# Patient Record
Sex: FEMALE | Race: WHITE | NOT HISPANIC OR LATINO | Employment: PART TIME | URBAN - METROPOLITAN AREA
[De-identification: names, ages, dates, MRNs, and addresses within clinical notes are randomized per-mention and may not be internally consistent; named-entity substitution may affect disease eponyms.]

---

## 2017-05-18 ENCOUNTER — HOSPITAL ENCOUNTER (OUTPATIENT)
Dept: RADIOLOGY | Facility: MEDICAL CENTER | Age: 22
End: 2017-05-18

## 2017-06-26 ENCOUNTER — TELEPHONE (OUTPATIENT)
Dept: PEDIATRIC ENDOCRINOLOGY | Facility: MEDICAL CENTER | Age: 22
End: 2017-06-26

## 2017-06-26 NOTE — TELEPHONE ENCOUNTER
Patient has an appt in 2 wks but she is out of her     Hydrocort 5 mg BID can she get a refill until then.    If so pharmacy is on file.

## 2017-06-27 DIAGNOSIS — E23.0 HYPOPITUITARISM (HCC): ICD-10-CM

## 2017-06-27 RX ORDER — HYDROCORTISONE 5 MG/1
5 TABLET ORAL 2 TIMES DAILY
Qty: 62 TAB | Refills: 0 | Status: SHIPPED | OUTPATIENT
Start: 2017-06-27 | End: 2017-07-13 | Stop reason: SDUPTHER

## 2017-06-30 VITALS
BODY MASS INDEX: 24.81 KG/M2 | SYSTOLIC BLOOD PRESSURE: 108 MMHG | WEIGHT: 131.39 LBS | DIASTOLIC BLOOD PRESSURE: 68 MMHG | HEART RATE: 92 BPM | HEIGHT: 61 IN

## 2017-06-30 DIAGNOSIS — R94.7 ABNORMAL RESULTS OF OTHER ENDOCRINE FUNCTION STUDIES: ICD-10-CM

## 2017-06-30 DIAGNOSIS — R62.0 DELAYED MILESTONE IN CHILDHOOD: ICD-10-CM

## 2017-06-30 DIAGNOSIS — E23.0 HYPOPITUITARISM (HCC): ICD-10-CM

## 2017-06-30 DIAGNOSIS — C71.8 MALIGNANT NEOPLASM OF OVERLAPPING SITES OF BRAIN (HCC): ICD-10-CM

## 2017-06-30 DIAGNOSIS — R53.82 CHRONIC FATIGUE: ICD-10-CM

## 2017-06-30 DIAGNOSIS — R62.50 UNSPECIFIED LACK OF EXPECTED NORMAL PHYSIOLOGICAL DEVELOPMENT IN CHILDHOOD: ICD-10-CM

## 2017-06-30 RX ORDER — FOLIC ACID 1 MG/1
2 TABLET ORAL DAILY
COMMUNITY
End: 2019-10-08 | Stop reason: SDUPTHER

## 2017-06-30 RX ORDER — LORAZEPAM 2 MG/1
2 TABLET ORAL PRN
Status: ON HOLD | COMMUNITY
End: 2019-08-23 | Stop reason: SDUPTHER

## 2017-06-30 RX ORDER — LEVOTHYROXINE SODIUM 0.05 MG/1
50 TABLET ORAL
COMMUNITY
End: 2017-09-11 | Stop reason: SDUPTHER

## 2017-06-30 RX ORDER — PHENYTOIN SODIUM 100 MG/1
0-200 CAPSULE, EXTENDED RELEASE ORAL
Status: ON HOLD | COMMUNITY
End: 2019-08-23

## 2017-07-10 NOTE — PROGRESS NOTES
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 She underwent a partial resection  of the tumor followed by chemotherapy, craniospinal radiation (unknown  dose) and ultimately autologous stem cell transplant. She has seizures for  which she is on tapering doses of Depakote and Phenobarbitol and  replacing with new meds as she continues to have significant seizure  activity.  She has primary ovarian failure with significantly elevated LH and  FSH levels. She was started on Lo-Ogestrel tabs; however, she was having  significant breakthrough bleeding on these all throughout the cycle.  Therefore, she was swtiched to Premarin/Provera cycling. ON her present  dose, she has never had a period or any spotting. She denies any cramping  as well.  She has had two low free T4 levels and was started on Synthroid  replacement several years ago. At that same time she was also started on  hydrocortisone replacement with the assumption that she had ACTH  deficiency.. In the past, her IGF-1 level was normal for age however. Given  the fact that she had craniospinal irradiation it is not surprising that she is  having pituitary hormone deficiencies now.  I have not seen her in over a year now. She seems to be doing quite  well on her medications. She is very diligent about taking her medications  with her mom's oversight.  She did have removal of her hippocampus in February 2015 by Dr. pineda. This was because she had worsening of her seizures. Since that time  she has not had any further seizures whatsoever. However, she clearly has  even more memory issues than she has in the past. They're hoping that in  another month or 2 that they can start weaning her off of her seizure meds  assuming that she remains seizure-free.  She continues to have normal cycles while on Premarin and Provera  although typically her menses starts on around the 21st of 22nd of  every  month instead of when she is done with Provera. It then stopped after 3-4  days. She is not having any trouble tolerating these medications.  Additionally she is on Synthroid as well as hydrocortisone. Mom did give  her a triple dose not too long ago and mom verified that she did get SoluCortef  stressed dose before she had her surgery in February 2015.  Overall, she does not feel that her energy level is any different than it  was a year or so ago. Her most recent labs are noted below and show that  the free T4 is still in the normal range although at the lower end of normal  and lower than it was in the past. However she would like to stay on her  present dose of Synthroid.  Otherwise, she is attending school just vocational pieces of that and no  academic pieces. 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  See review of systems for further information.    1. 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 Unspecified lack of expected normal physiological development in  childhood - R62.50  3. Delayed milestone in childhood - R62.0  4. Malignant neoplasm of overlapping sites of brain - C71.8  5. Other fatigue - R53.83  6. Abnormal results of other endocrine function studies - R94.7  Clinically she seems to be doing quite well and her interaction with me  today was much improved over the past. She is much quicker to speak and  more conversational. This may be due to the fact that she's not having as  many seizures anymore after her surgery. At this point we'll leave her on  all of her medications and I will see her back in about 6 months time.    Past Medical History  Primary ovarian failure  Hypopituitarism  supratentorial PNET - dx 6/2009  H/o partial tumor resection x 2, chemo, stem  cell transplant, craniospinal radiation  seizure disorder - daily ONFI + Dilantin  Febrile seizures until ~ age 6 yr  R foot drop  Very poor short term memory    Surgical History  tumor resection as noted  above.  removal of L hippocampus for seizure control -  Dr Tio Valero 2/2015    Family History  Pertinent Positive: unknown  unknown - adopted from [place] @ 13 mos.    Social History  Smoking Smoking Status Never Smoker.  graduated [employer]  6/2014;- back at school  [employer] for vocational training. Also volunteers at  [facility] lives with mom and 2 younger adopted  sisters (not biologic).    Allergies  N.K.D.A.    Hospitalization/Major Diagnostic Procedure  Stem cell transplant  Neurosurgery x 2, chemotherapy, XRT

## 2017-07-13 ENCOUNTER — OFFICE VISIT (OUTPATIENT)
Dept: PEDIATRIC ENDOCRINOLOGY | Facility: MEDICAL CENTER | Age: 22
End: 2017-07-13
Payer: MEDICAID

## 2017-07-13 ENCOUNTER — HOSPITAL ENCOUNTER (OUTPATIENT)
Dept: LAB | Facility: MEDICAL CENTER | Age: 22
End: 2017-07-13
Attending: PEDIATRICS
Payer: MEDICAID

## 2017-07-13 VITALS
WEIGHT: 138.23 LBS | HEIGHT: 61 IN | BODY MASS INDEX: 26.1 KG/M2 | SYSTOLIC BLOOD PRESSURE: 92 MMHG | HEART RATE: 76 BPM | DIASTOLIC BLOOD PRESSURE: 60 MMHG

## 2017-07-13 DIAGNOSIS — R62.0 DELAYED MILESTONE IN CHILDHOOD: ICD-10-CM

## 2017-07-13 DIAGNOSIS — R62.50 UNSPECIFIED LACK OF EXPECTED NORMAL PHYSIOLOGICAL DEVELOPMENT IN CHILDHOOD: ICD-10-CM

## 2017-07-13 DIAGNOSIS — C71.8 MALIGNANT NEOPLASM OF OVERLAPPING SITES OF BRAIN (HCC): ICD-10-CM

## 2017-07-13 DIAGNOSIS — E23.0 HYPOPITUITARISM (HCC): ICD-10-CM

## 2017-07-13 DIAGNOSIS — R94.7 ABNORMAL RESULTS OF OTHER ENDOCRINE FUNCTION STUDIES: ICD-10-CM

## 2017-07-13 LAB
ALBUMIN SERPL BCP-MCNC: 4.2 G/DL (ref 3.2–4.9)
ALBUMIN/GLOB SERPL: 1.3 G/DL
ALP SERPL-CCNC: 130 U/L (ref 30–99)
ALT SERPL-CCNC: 17 U/L (ref 2–50)
ANION GAP SERPL CALC-SCNC: 5 MMOL/L (ref 0–11.9)
AST SERPL-CCNC: 18 U/L (ref 12–45)
BASOPHILS # BLD AUTO: 0.1 % (ref 0–1.8)
BASOPHILS # BLD: 0.01 K/UL (ref 0–0.12)
BILIRUB SERPL-MCNC: 0.3 MG/DL (ref 0.1–1.5)
BUN SERPL-MCNC: 11 MG/DL (ref 8–22)
CALCIUM SERPL-MCNC: 9.3 MG/DL (ref 8.5–10.5)
CHLORIDE SERPL-SCNC: 103 MMOL/L (ref 96–112)
CO2 SERPL-SCNC: 30 MMOL/L (ref 20–33)
CREAT SERPL-MCNC: 0.68 MG/DL (ref 0.5–1.4)
EOSINOPHIL # BLD AUTO: 0.08 K/UL (ref 0–0.51)
EOSINOPHIL NFR BLD: 1.1 % (ref 0–6.9)
ERYTHROCYTE [DISTWIDTH] IN BLOOD BY AUTOMATED COUNT: 45.7 FL (ref 35.9–50)
EST. AVERAGE GLUCOSE BLD GHB EST-MCNC: 108 MG/DL
GFR SERPL CREATININE-BSD FRML MDRD: >60 ML/MIN/1.73 M 2
GLOBULIN SER CALC-MCNC: 3.2 G/DL (ref 1.9–3.5)
GLUCOSE SERPL-MCNC: 75 MG/DL (ref 65–99)
HBA1C MFR BLD: 5.4 % (ref 0–5.6)
HCT VFR BLD AUTO: 40.4 % (ref 37–47)
HGB BLD-MCNC: 13.1 G/DL (ref 12–16)
IMM GRANULOCYTES # BLD AUTO: 0.02 K/UL (ref 0–0.11)
IMM GRANULOCYTES NFR BLD AUTO: 0.3 % (ref 0–0.9)
LYMPHOCYTES # BLD AUTO: 2.4 K/UL (ref 1–4.8)
LYMPHOCYTES NFR BLD: 34.1 % (ref 22–41)
MCH RBC QN AUTO: 30 PG (ref 27–33)
MCHC RBC AUTO-ENTMCNC: 32.4 G/DL (ref 33.6–35)
MCV RBC AUTO: 92.7 FL (ref 81.4–97.8)
MONOCYTES # BLD AUTO: 0.55 K/UL (ref 0–0.85)
MONOCYTES NFR BLD AUTO: 7.8 % (ref 0–13.4)
NEUTROPHILS # BLD AUTO: 3.98 K/UL (ref 2–7.15)
NEUTROPHILS NFR BLD: 56.6 % (ref 44–72)
NRBC # BLD AUTO: 0 K/UL
NRBC BLD AUTO-RTO: 0 /100 WBC
PLATELET # BLD AUTO: 166 K/UL (ref 164–446)
PMV BLD AUTO: 8.3 FL (ref 9–12.9)
POTASSIUM SERPL-SCNC: 4.7 MMOL/L (ref 3.6–5.5)
PROT SERPL-MCNC: 7.4 G/DL (ref 6–8.2)
RBC # BLD AUTO: 4.36 M/UL (ref 4.2–5.4)
SODIUM SERPL-SCNC: 138 MMOL/L (ref 135–145)
T4 SERPL-MCNC: 7.8 UG/DL (ref 4–12)
WBC # BLD AUTO: 7 K/UL (ref 4.8–10.8)

## 2017-07-13 PROCEDURE — 80053 COMPREHEN METABOLIC PANEL: CPT

## 2017-07-13 PROCEDURE — 99215 OFFICE O/P EST HI 40 MIN: CPT | Performed by: PEDIATRICS

## 2017-07-13 PROCEDURE — 85025 COMPLETE CBC W/AUTO DIFF WBC: CPT

## 2017-07-13 PROCEDURE — 84305 ASSAY OF SOMATOMEDIN: CPT

## 2017-07-13 PROCEDURE — 84436 ASSAY OF TOTAL THYROXINE: CPT

## 2017-07-13 PROCEDURE — 82397 CHEMILUMINESCENT ASSAY: CPT

## 2017-07-13 PROCEDURE — 83036 HEMOGLOBIN GLYCOSYLATED A1C: CPT

## 2017-07-13 PROCEDURE — 36415 COLL VENOUS BLD VENIPUNCTURE: CPT

## 2017-07-13 RX ORDER — HYDROCORTISONE 5 MG/1
5 TABLET ORAL 2 TIMES DAILY
Qty: 62 TAB | Refills: 0 | Status: SHIPPED | OUTPATIENT
Start: 2017-07-13 | End: 2017-08-13

## 2017-07-13 ASSESSMENT — PATIENT HEALTH QUESTIONNAIRE - PHQ9: CLINICAL INTERPRETATION OF PHQ2 SCORE: 1

## 2017-07-13 NOTE — MR AVS SNAPSHOT
"        Miles Lopez Hodorowilani   2017 9:30 AM   Office Visit   MRN: [id number]    Department:  Peds Endocrinology   Dept Phone:  [phone]    Description:  Female : 1995   Provider:  Ai Moore M.D.           Reason for Visit     Follow-Up           Allergies as of 2017     No Known Allergies      You were diagnosed with     Hypopituitarism (CMS-HCC)   [080154]       Unspecified lack of expected normal physiological development in childhood   [4720540]       Delayed milestone in childhood   [099487]       Malignant neoplasm of overlapping sites of brain (CMS-HCC)   [974491]       Abnormal results of other endocrine function studies   [4228787]         Vital Signs     Blood Pressure Pulse Height Weight Body Mass Index Smoking Status    92/60 mmHg 76 1.551 m (5' 1.06\") 62.7 kg (138 lb 3.7 oz) 26.06 kg/m2 Never Smoker       Basic Information     Date Of Birth Sex Race Ethnicity Preferred Language    1995 Female White Non- English      Your appointments     2018  1:00 PM   Follow Up Visit with Ai Moore M.D.   [facility] (--)    [address] [place] [address] [phone]           You will be receiving a confirmation call a few days before your appointment from our automated call confirmation system.              Problem List              ICD-10-CM Priority Class Noted - Resolved    Hypopituitarism (CMS-HCC) E23.0   2017 - Present    Unspecified lack of expected normal physiological development in childhood R62.50   2017 - Present    Delayed milestone in childhood R62.0   2017 - Present    Malignant neoplasm of overlapping sites of brain (CMS-HCC) C71.8   2017 - Present    Chronic fatigue R53.82   2017 - Present    Abnormal results of other endocrine function studies R94.7   2017 - Present      Health Maintenance        Date Due Completion Dates    IMM HEP B VACCINE ( 3 - " Primary Series) 1995 ---    IMM HEP A VACCINE (1 of 2 - Standard Series) 7/18/1997 ---    IMM HPV VACCINE (1 of 3 - Female 3 Dose Series) 7/18/2007 ---    IMM VARICELLA (CHICKENPOX) VACCINE (1 of 2 - 2 Dose Adolescent Series) 7/18/2009 ---    IMM MENINGOCOCCAL VACCINE (MCV4) (1 of 1) 7/18/2012 ---    IMM DTaP/Tdap/Td Vaccine (1 - Tdap) 7/18/2015 ---    IMM INFLUENZA (1) 9/1/2017 ---            Current Immunizations     No immunizations on file.      Below and/or attached are the medications your provider expects you to take. Review all of your home medications and newly ordered medications with your provider and/or pharmacist. Follow medication instructions as directed by your provider and/or pharmacist. Please keep your medication list with you and share with your provider. Update the information when medications are discontinued, doses are changed, or new medications (including over-the-counter products) are added; and carry medication information at all times in the event of emergency situations     Allergies:  No Known Allergies          Medications  Valid as of: July 13, 2017 - 10:21 AM    Generic Name Brand Name Tablet Size Instructions for use    Acetaminophen (Tab) TYLENOL 325 MG Take 650 mg by mouth every 6 hours as needed for Mild Pain. DO NOT give if febrile without consulting MD         Calcium Carbonate-Vitamin D (Tab) Calcium Carbonate-Vitamin D 600-400 MG-UNIT Take  by mouth.        CloBAZam (Tab) ONFI 5 MG Take 10 mg by mouth 2 Times a Day.        Estrogens Conjugated (Tab) PREMARIN 0.9 MG Take 0.9 mg by mouth every day.        Folic Acid (Tab) FOLVITE 1 MG Take 1 mg by mouth 2 Times a Day.        Hydrocortisone (Tab) CORTEF 5 MG Take 1 Tab by mouth 2 Times a Day for 31 days.        Hydrocortisone Sod Succinate (Recon Soln) SOLU-CORTEF 100  mg by Intravenous route every 8 hours.        Levothyroxine Sodium (Tab) SYNTHROID 50 MCG Take 50 mcg by mouth Every morning on an empty stomach.           LORazepam (Tab) ATIVAN 2 MG Take 2 mg by mouth as needed (seizures).        MedroxyPROGESTERone Acetate (Tab) PROVERA 10 MG Take 5 mg by mouth every day.        Multiple Vitamins-Minerals   Take 1 Tab by mouth every day. Multivitamin without iron         Phenytoin Sodium Extended (Cap) DILANTIN 100 MG Take 300 mg by mouth every bedtime.        Pyridoxine HCl (Tab) VITAMIN B-6 50 MG Take 50 mg by mouth every day.        Soft Lens Products (Solution) Saline 0.9 % 80 mL by Does not apply route every day. Per home regimen        Voriconazole (Tab) VFEND 200 MG Take 200 mg by mouth 2 times a day.        .                 Medicines prescribed today were sent to:     [id number] [address] [address]    Phone: [phone] Fax: [phone]    Open 24 Hours?: No      Medication refill instructions:       If your prescription bottle indicates you have medication refills left, it is not necessary to call your provider’s office. Please contact your pharmacy and they will refill your medication.    If your prescription bottle indicates you do not have any refills left, you may request refills at any time through one of the following ways: The online [employer] system (except Urgent Care), by calling your provider’s office, or by asking your pharmacy to contact your provider’s office with a refill request. Medication refills are processed only during regular business hours and may not be available until the next business day. Your provider may request additional information or to have a follow-up visit with you prior to refilling your medication.   *Please Note: Medication refills are assigned a new Rx number when refilled electronically. Your pharmacy may indicate that no refills were authorized even though a new prescription for the same medication is available at the pharmacy. Please request the medicine by name with the pharmacy before contacting  your provider for a refill.        Your To Do List     Future Labs/Procedures Complete By Expires    CBC w Differential  As directed 7/13/2018    Comprehensive Metabolic Panel  As directed 7/13/2018    Hemoglobin A1C  As directed 7/13/2018    IGF 1 Somatomedin  As directed 7/13/2018    IGF BP-3  As directed 7/13/2018    T4 Total  As directed 7/13/2018         Life Metrics Access Code: [email or web address]  Expires: 8/12/2017 10:21 AM    Life Metrics  A secure, online tool to manage your health information     [employer] Life Metrics® is a secure, online tool that connects you to your personalized health information from the privacy of your home -- day or night - making it very easy for you to manage your healthcare. Once the activation process is completed, you can even access your medical information using the Life Metrics octavia, which is available for free in the Apple Octavia store or Google Play store.     Life Metrics provides the following levels of access (as shown below):   My Chart Features   [facility] Doctor [facility]  Specialists [facility]  Urgent  Care Non-[facility]  Primary Care  Doctor   Email your healthcare team securely and privately 24/7 X X X    Manage appointments: schedule your next appointment; view details of past/upcoming appointments X      Request prescription refills. X      View recent personal medical records, including lab and immunizations X X X X   View health record, including health history, allergies, medications X X X X   Read reports about your outpatient visits, procedures, consult and ER notes X X X X   See your discharge summary, which is a recap of your hospital and/or ER visit that includes your diagnosis, lab results, and care plan. X X       How to register for Life Metrics:  1. Go to  [email or web address].  2. Click on the Sign Up Now box, which takes you to the New Member Sign Up page. You will need to provide the following information:  a. Enter your Life Metrics Access Code exactly as it appears at  the top of this page. (You will not need to use this code after you’ve completed the sign-up process. If you do not sign up before the expiration date, you must request a new code.)   b. Enter your date of birth.   c. Enter your home email address.   d. Click Submit, and follow the next screen’s instructions.  3. Create a [employer] ID. This will be your [employer] login ID and cannot be changed, so think of one that is secure and easy to remember.  4. Create a [employer] password. You can change your password at any time.  5. Enter your Password Reset Question and Answer. This can be used at a later time if you forget your password.   6. Enter your e-mail address. This allows you to receive e-mail notifications when new information is available in [employer].  7. Click Sign Up. You can now view your health information.    For assistance activating your [employer] account, call [phone]

## 2017-07-13 NOTE — PROGRESS NOTES
Subjective:     Chief Complaint   Patient presents with   • Follow-Up       HPI  Miles Georgina Paredes is a 21 y.o. 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 She underwent a partial resection  of the tumor followed by chemotherapy, craniospinal radiation (unknown  dose) and ultimately autologous stem cell transplant.    She has primary ovarian failure with significantly elevated LH and  FSH levels. She was started on Lo-Ogestrel tabs; however, she was having  significant breakthrough bleeding on these all throughout the cycle.  Therefore, she was swtiched to Premarin/Provera cycling.     She has had two low free T4 levels and was started on Synthroid  replacement several years ago. At that same time she was also started on  hydrocortisone replacement with the assumption that she had ACTH  deficiency.. In the past, her IGF-1 level was normal for age however. Given  the fact that she had craniospinal irradiation it is not surprising that she is  having pituitary hormone deficiencies now.    She did have removal of her hippocampus in February 2015 by Dr. Valero. This was because she had worsening of her seizures.    I have not seen her in over a year and, over the last couple of months she has had an increase in her seizures once again. She recently saw the neurologist and [facility] and they're waiting for some feedback on her labs and any dosage adjustments. Otherwise, her general health has been pretty good. She is having normal cycling with vaginal bleeding although it sounds like not always at the end of the month when she should be having it on the progesterone, estrogen cycling. Otherwise, she's been doing very well in terms of taking all of her medications. She recently had doubling of her medical present for some stress but otherwise no other issues. I noticed that she has a significant increase in her communication skills as well as her memory since her last  visit here. This is interesting in light of the surgical removal of the hippocampus. 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   HENT: Negative for congestion, ear discharge, ear pain, hearing loss, nosebleeds, sore throat and tinnitus.   Eyes: Negative for blurred vision, double vision, photophobia, pain, discharge and redness.   Respiratory: Negative for cough, hemoptysis, sputum production, shortness of breath, wheezing and stridor.    Cardiovascular: Negative for chest pain, palpitations, orthopnea, claudication, leg swelling and PND.   Gastrointestinal: Negative for heartburn, nausea, vomiting, abdominal pain, diarrhea, constipation, blood in stool and melena.   Genitourinary: Negative for dysuria, urgency, frequency, hematuria and flank pain.  Musculoskeletal: Negative for myalgias, back pain, joint pain, falls and neck pain.   Skin: Negative for itching and rash.   Neurological: Negative for dizziness, tingling, tremors, sensory change, speech change, focal weakness, seizures, loss of consciousness, weakness and headaches.   Endo/Heme/Allergies: Negative for environmental allergies and polydipsia. Does not bruise/bleed easily.   Psychiatric/Behavioral: Negative for depression, suicidal ideas, hallucinations, memory loss and substance abuse. The patient is not nervous/anxious and does not have insomnia.     No Known Allergies    Current Outpatient Prescriptions   Medication Sig Dispense Refill   • conjugated estrogen (PREMARIN) 0.9 MG Tab Take 0.9 mg by mouth every day.     • phenytoin ER (DILANTIN) 100 MG Cap Take 300 mg by mouth every bedtime.     • Calcium Carbonate-Vitamin D (CALCIUM 600 + D) 600-400 MG-UNIT Tab Take  by mouth.     • folic acid (FOLVITE) 1 MG Tab Take 1 mg by mouth 2 Times a Day.     • clobazam  "(ONFI) 5 MG Tab tablet Take 10 mg by mouth 2 Times a Day.     • levothyroxine (SYNTHROID) 50 MCG Tab Take 50 mcg by mouth Every morning on an empty stomach.     • hydrocortisone (CORTEF) 5 MG Tab Take 1 Tab by mouth 2 Times a Day for 31 days. 62 Tab 0   • medroxyPROGESTERone (PROVERA) 10 MG TABS Take 5 mg by mouth every day.     • hydrocortisone sodium succinate PF (SOLU-CORTEF) 100 MG Recon Soln injection 100 mg by Intravenous route every 8 hours.     • lorazepam (ATIVAN) 2 MG tablet Take 2 mg by mouth as needed (seizures).     • Multiple Vitamin (MULTIVITAMIN PO) Take 1 Tab by mouth every day. Multivitamin without iron      • voriconazole (VFEND) 200 MG TABS Take 200 mg by mouth 2 times a day.     • pyridoxine 50 MG TABS Take 50 mg by mouth every day.     • acetaminophen (TYLENOL) 325 MG TABS Take 650 mg by mouth every 6 hours as needed for Mild Pain. DO NOT give if febrile without consulting MD      • Soft Lens Products (SALINE) 0.9 % SOLN 80 mL by Does not apply route every day. Per home regimen       No current facility-administered medications for this visit.       Social History     Social History   • Marital Status: Single     Spouse Name: N/A   • Number of Children: N/A   • Years of Education: N/A     Occupational History   • Not on file.     Social History Main Topics   • Smoking status: Never Smoker    • Smokeless tobacco: Not on file   • Alcohol Use: No   • Drug Use: No   • Sexual Activity: Not on file     Other Topics Concern   • Not on file     Social History Narrative    Adopted from [place] @ 13 months    Graduated [employer]  6/2014, back at school [employer] for vocational training    Also volunteers at [facility] lives with mom and 2 younger adopted sisters (not bio)          Objective:   Blood pressure 92/60, pulse 76, height 1.551 m (5' 1.06\"), weight 62.7 kg (138 lb 3.7 oz).    Physical Exam   Constitutional: she is oriented to person, place, and time. she appears well-developed and well-nourished. Sparse hair "   Skin: Skin is warm and dry.   Head: Normocephalic and atraumatic.    Eyes: Pupils are equal, round, and reactive to light. No scleral icterus.  Mouth/Throat: Tongue normal. Oropharynx is clear and moist. Posterior pharynx without erythema or exudates.  Neck: Supple, trachea midline. No thyromegaly present.   Cardiovascular: Normal rate and regular rhythm.  Chest: Effort normal. Clear to auscultation throughout. No adventitious sounds.  Abdominal: Soft, non tender, and without distention. Active bowel sounds in all four quadrants. No rebound, guarding, masses or hepatosplenomegaly.  Extremities: No cyanosis, clubbing, erythema, nor edema.   Neurological: she is alert and oriented to person, place, and time. she has normal reflexes.   : Micha 5  Psychiatric: she has a normal mood and affect. her behavior is normal       Assessment and Plan:   The following treatment plan was discussed:   1. Hypopituitarism (CMS-HCC)    - Comprehensive Metabolic Panel; Future  - CBC w Differential; Future  - Hemoglobin A1C; Future  - IGF 1 Somatomedin; Future  - IGF BP-3; Future  - T4 Total; Future    2. Unspecified lack of expected normal physiological development in childhood    - Comprehensive Metabolic Panel; Future  - CBC w Differential; Future  - Hemoglobin A1C; Future  - IGF 1 Somatomedin; Future  - IGF BP-3; Future  - T4 Total; Future    3. Delayed milestone in childhood    - Comprehensive Metabolic Panel; Future  - CBC w Differential; Future  - Hemoglobin A1C; Future  - IGF 1 Somatomedin; Future  - IGF BP-3; Future  - T4 Total; Future    4. Malignant neoplasm of overlapping sites of brain (CMS-HCC)    - Comprehensive Metabolic Panel; Future  - CBC w Differential; Future  - Hemoglobin A1C; Future  - IGF 1 Somatomedin; Future  - IGF BP-3; Future  - T4 Total; Future    5. Abnormal results of other endocrine function studies  - Comprehensive Metabolic Panel; Future  - CBC w Differential; Future  - Hemoglobin A1C; Future  -  IGF 1 Somatomedin; Future  - IGF BP-3; Future  - T4 Total; Future          At this point, she seems to be doing well from an endocrine standpoint although we have not had labs done in quite some time. Therefore, we will follow up with her labs and in addition renew all of her prescriptions at this point. I will see her back in 6 months time. Greater than 50% of the time spent today was in counseling with regards to nutrition, exercise, general health issues, and medications    Follow-Up: Return in about 6 months (around 1/13/2018).

## 2017-07-13 NOTE — Clinical Note
July 13, 2017        Miles Erickson Jessica Paredes    To Whom It May concern:    For medical reasons, Miles  Is required to carry injectable medication and syringes with her at all times.      Thank you for your consideration,          Ai Moore MD    Pediatric Endocrinology

## 2017-07-14 LAB
IGF BP3 SERPL-MCNC: 4950 NG/ML (ref 3032–5992)
IGF-I SERPL-MCNC: 167 NG/ML (ref 87–368)

## 2017-07-24 ENCOUNTER — TELEPHONE (OUTPATIENT)
Dept: PEDIATRIC ENDOCRINOLOGY | Facility: MEDICAL CENTER | Age: 22
End: 2017-07-24

## 2017-07-24 NOTE — TELEPHONE ENCOUNTER
----- Message from Ai Moore M.D. sent at 7/21/2017  1:25 PM PDT -----  Labs are normal- continue current meds

## 2017-07-24 NOTE — TELEPHONE ENCOUNTER
Phone Number Called: [phone] (home)       Message: LM for mom to cb regarding results.     Left Message for patient to call back: yes

## 2017-09-08 DIAGNOSIS — E23.0 HYPOPITUITARISM (HCC): ICD-10-CM

## 2017-09-11 DIAGNOSIS — E23.0 HYPOPITUITARISM (HCC): ICD-10-CM

## 2017-09-11 RX ORDER — LEVOTHYROXINE SODIUM 0.05 MG/1
50 TABLET ORAL
Qty: 30 TAB | Refills: 5 | Status: SHIPPED | OUTPATIENT
Start: 2017-09-11 | End: 2018-04-22 | Stop reason: SDUPTHER

## 2017-09-11 NOTE — TELEPHONE ENCOUNTER
Was the patient seen in the last year in this department? Yes     Does patient have an active prescription for medications requested? Yes     Received Request Via: Pharmacy     Also needed is:    HYDROCORT 5 MG TAB PARP  For> cortef 5 mg  ta  Qty. 62  Take one by mouth twice daily

## 2017-09-12 DIAGNOSIS — E23.0 PANHYPOPITUITARISM (HCC): ICD-10-CM

## 2017-09-12 RX ORDER — HYDROCORTISONE 5 MG/1
5 TABLET ORAL 2 TIMES DAILY
Qty: 75 TAB | Refills: 5 | Status: SHIPPED | OUTPATIENT
Start: 2017-09-12 | End: 2018-04-11 | Stop reason: SDUPTHER

## 2017-10-19 ENCOUNTER — HOSPITAL ENCOUNTER (OUTPATIENT)
Dept: RADIOLOGY | Facility: MEDICAL CENTER | Age: 22
End: 2017-10-19
Attending: NEUROLOGICAL SURGERY
Payer: MEDICAID

## 2017-10-19 DIAGNOSIS — D49.6 BRAIN NEOPLASM (HCC): ICD-10-CM

## 2017-10-19 PROCEDURE — 70553 MRI BRAIN STEM W/O & W/DYE: CPT

## 2017-10-19 PROCEDURE — 700117 HCHG RX CONTRAST REV CODE 255: Performed by: NEUROLOGICAL SURGERY

## 2017-10-19 PROCEDURE — A9579 GAD-BASE MR CONTRAST NOS,1ML: HCPCS | Performed by: NEUROLOGICAL SURGERY

## 2017-10-19 RX ADMIN — GADODIAMIDE 14 ML: 287 INJECTION INTRAVENOUS at 09:09

## 2017-11-06 ENCOUNTER — HOSPITAL ENCOUNTER (OUTPATIENT)
Dept: INFUSION CENTER | Facility: MEDICAL CENTER | Age: 22
End: 2017-11-06
Attending: NEUROLOGICAL SURGERY
Payer: MEDICAID

## 2017-11-06 VITALS — HEART RATE: 83 BPM | RESPIRATION RATE: 18 BRPM | OXYGEN SATURATION: 98 % | TEMPERATURE: 97.2 F

## 2017-11-06 PROCEDURE — 999999 HB NO CHARGE

## 2017-11-06 PROCEDURE — 99212 OFFICE O/P EST SF 10 MIN: CPT

## 2017-11-06 NOTE — PROGRESS NOTES
The Medication Reconciliation process has been completed by interviewing the patient and family    Allergies have been reviewed  Antibiotic use in 30 days - none    Home Pharmacy:  [facility]

## 2017-11-06 NOTE — PROGRESS NOTES
Pt to [facility] for Neurosurgery visit, accompanied by mother.  Pt awake and alert, afebrile, VSS.  Visit completed with Dr. Valero.  Will schedule follow-up in 12 months with MRI.   Pt home with mother.    Level of Care/Points                 Assessment   Pts      Focused nursing assessment    Full nursing assessment   5 Vital signs - calculate every time perfomed          Special Needs    Pediatric/Minor Patient Management    Hear/Language/Visual special needs    Additional assistance/Altered mentation/physical limitations    Play Therapy/Diversion Activity    Isolation Management         Focused Assessment    Pain assessment    Neuro assessment    Potential abuse assessment           Coordination of Care   5 Simple Patient/Family/Staff Education for ongoing care    Complex Patient/Family/Staff Education for ongoing care   5 Staff retrieves consents, Records, test results, processes orders    Staff Telephones Physician office to clarify orders    Coordination of consults   10 Simple Discharge Coordination    Complex (extensive) Discharge Coordination         Interventions    PO meds 1-3 calculate additional 5 points for 4-6 meds and apply as many times as needed    Sublingual Meds (1-3)    Sublingual Meds (4-6)    Suppositories calculate for each time given    Topical Meds (1-3), these medications include topical lidocaine, ointment, ect    Topical Meds (4-6), these medications include topical lidocaine, ointment, ect       Eye Drops - eye drops should be calculated per time given.  Multiple drops per eye should not be counted seperately    Medication Titration calculation once    Oxygen Cannula only if placed by staff    Oxygen Mask only if placed by staff         Central Venous Access Device    Sterile dressing change    PICC arm circumference and external catheter    Central Venous Catheter Removal         Miscellaneous    Difficult Specimen collection 0-3 years old (cultures, biopsies, blood,  bodily fluids, etc    Patient Transfer (multiple staff/Lift equipment    Replace Tracheostomy Tube    Tracheostomy care and dressing change    Tracheostomy suctioning    Medication Reaction    Blood Product Reaction       Point Assessment     New/Established Patient - Level 1 (15-20 points)    x New/Established Patient - Level 2 (21-45 points)     New/Established Patient - Level 3 (46-70 points)     New/Established Patient - Level 4 ( points)     New/Established Patient - Level 5 (106 or more)

## 2017-12-09 ENCOUNTER — HOSPITAL ENCOUNTER (OUTPATIENT)
Dept: RADIOLOGY | Facility: MEDICAL CENTER | Age: 22
End: 2017-12-09
Attending: PSYCHIATRY & NEUROLOGY
Payer: MEDICARE

## 2017-12-09 DIAGNOSIS — H46.8 OTHER OPTIC NEURITIS: ICD-10-CM

## 2017-12-09 PROCEDURE — 70543 MRI ORBT/FAC/NCK W/O &W/DYE: CPT

## 2017-12-09 PROCEDURE — A9579 GAD-BASE MR CONTRAST NOS,1ML: HCPCS | Performed by: PSYCHIATRY & NEUROLOGY

## 2017-12-09 PROCEDURE — 700117 HCHG RX CONTRAST REV CODE 255: Performed by: PSYCHIATRY & NEUROLOGY

## 2017-12-09 RX ADMIN — GADODIAMIDE 15 ML: 287 INJECTION INTRAVENOUS at 09:35

## 2018-01-04 ENCOUNTER — APPOINTMENT (OUTPATIENT)
Dept: PEDIATRIC ENDOCRINOLOGY | Facility: MEDICAL CENTER | Age: 23
End: 2018-01-04
Payer: MEDICARE

## 2018-01-17 ENCOUNTER — TELEPHONE (OUTPATIENT)
Dept: PEDIATRIC ENDOCRINOLOGY | Facility: MEDICAL CENTER | Age: 23
End: 2018-01-17

## 2018-01-18 NOTE — TELEPHONE ENCOUNTER
Mom called requesting to talk to you and had question because insurance problems with the medication premarin stating it is in non-formulary review.    Please call Mom at: # [phone]

## 2018-04-11 DIAGNOSIS — E23.0 PANHYPOPITUITARISM (HCC): ICD-10-CM

## 2018-04-16 RX ORDER — HYDROCORTISONE 5 MG/1
TABLET ORAL
Qty: 75 TAB | Refills: 3 | Status: SHIPPED | OUTPATIENT
Start: 2018-04-16 | End: 2018-09-04 | Stop reason: SDUPTHER

## 2018-04-21 DIAGNOSIS — E23.0 HYPOPITUITARISM (HCC): ICD-10-CM

## 2018-04-22 DIAGNOSIS — E23.0 HYPOPITUITARISM (HCC): ICD-10-CM

## 2018-04-24 RX ORDER — ESTROGENS, CONJUGATED 0.9 MG
TABLET ORAL
Qty: 30 TAB | Refills: 4 | Status: SHIPPED | OUTPATIENT
Start: 2018-04-24 | End: 2018-11-29

## 2018-04-24 RX ORDER — LEVOTHYROXINE SODIUM 0.05 MG/1
50 TABLET ORAL
Qty: 30 TAB | Refills: 4 | Status: SHIPPED | OUTPATIENT
Start: 2018-04-24 | End: 2018-04-25 | Stop reason: SDUPTHER

## 2018-04-24 RX ORDER — MEDROXYPROGESTERONE ACETATE 5 MG/1
TABLET ORAL
Qty: 24 TAB | Refills: 3 | Status: SHIPPED | OUTPATIENT
Start: 2018-04-24 | End: 2018-11-29

## 2018-04-25 DIAGNOSIS — E23.0 HYPOPITUITARISM (HCC): ICD-10-CM

## 2018-04-27 RX ORDER — LEVOTHYROXINE SODIUM 0.05 MG/1
50 TABLET ORAL
Qty: 30 TAB | Refills: 4 | Status: SHIPPED | OUTPATIENT
Start: 2018-04-27 | End: 2019-06-11 | Stop reason: SDUPTHER

## 2018-09-04 DIAGNOSIS — E23.0 PANHYPOPITUITARISM (HCC): ICD-10-CM

## 2018-09-06 RX ORDER — HYDROCORTISONE 5 MG/1
TABLET ORAL
Qty: 75 TAB | Refills: 2 | Status: SHIPPED | OUTPATIENT
Start: 2018-09-06 | End: 2018-12-17 | Stop reason: SDUPTHER

## 2018-10-09 ENCOUNTER — TELEPHONE (OUTPATIENT)
Dept: PEDIATRIC ENDOCRINOLOGY | Facility: MEDICAL CENTER | Age: 23
End: 2018-10-09

## 2018-10-09 NOTE — TELEPHONE ENCOUNTER
Mom called stating she has an appointment for 11/29/18 mom is wondering if they have to wait until then to have   SOLU-CORTEF 100 MG Recon Soln injection [Pharmacy Med Name: SOLU-CORTEF 100 MG INJ PFIZ] [[id number]]   Order Details   Dose, Route, Frequency: As Directed    Dispense Quantity:  -- Refills:  0 Fills remaining:  --           Sig: INJECT 2 ML BY INTRAVENOUS ROUTE EVERY 8 HOURS.        Filled?    Please advise

## 2018-10-18 ENCOUNTER — TELEPHONE (OUTPATIENT)
Dept: PEDIATRIC ENDOCRINOLOGY | Facility: MEDICAL CENTER | Age: 23
End: 2018-10-18

## 2018-10-18 DIAGNOSIS — E23.0 PANHYPOPITUITARISM (HCC): ICD-10-CM

## 2018-11-12 NOTE — TELEPHONE ENCOUNTER
Ai Moore M.D.  Anel Bianchi R.N.   Caller: Unspecified (3 weeks ago)             Free T4, CMP, CBC, IGF-1, BP-3

## 2018-11-20 LAB
ALBUMIN SERPL-MCNC: 4.2 G/DL (ref 3.5–5.5)
ALBUMIN/GLOB SERPL: 1.4 {RATIO} (ref 1.2–2.2)
ALP SERPL-CCNC: 117 IU/L (ref 39–117)
ALT SERPL-CCNC: 12 IU/L (ref 0–32)
AST SERPL-CCNC: 18 IU/L (ref 0–40)
BASOPHILS # BLD AUTO: 0 X10E3/UL (ref 0–0.2)
BASOPHILS NFR BLD AUTO: 0 %
BILIRUB SERPL-MCNC: <0.2 MG/DL (ref 0–1.2)
BUN SERPL-MCNC: 12 MG/DL (ref 6–20)
BUN/CREAT SERPL: 16 (ref 9–23)
CALCIUM SERPL-MCNC: 9 MG/DL (ref 8.7–10.2)
CHLORIDE SERPL-SCNC: 99 MMOL/L (ref 96–106)
CO2 SERPL-SCNC: 26 MMOL/L (ref 20–29)
CREAT SERPL-MCNC: 0.73 MG/DL (ref 0.57–1)
EOSINOPHIL # BLD AUTO: 0.1 X10E3/UL (ref 0–0.4)
EOSINOPHIL NFR BLD AUTO: 1 %
ERYTHROCYTE [DISTWIDTH] IN BLOOD BY AUTOMATED COUNT: 13.7 % (ref 12.3–15.4)
GLOBULIN SER CALC-MCNC: 2.9 G/DL (ref 1.5–4.5)
GLUCOSE SERPL-MCNC: 82 MG/DL (ref 65–99)
HCT VFR BLD AUTO: 40.4 % (ref 34–46.6)
HGB BLD-MCNC: 13.8 G/DL (ref 11.1–15.9)
IF AFRICAN AMERICAN  100797: 135 ML/MIN/1.73
IF NON AFRICAN AMER 100791: 117 ML/MIN/1.73
IGF BP3 SERPL-MCNC: 3829 UG/L (ref 2855–6559)
IGF-I SERPL-MCNC: 105 NG/ML (ref 93–342)
IMM GRANULOCYTES # BLD: 0 X10E3/UL (ref 0–0.1)
IMM GRANULOCYTES NFR BLD: 0 %
IMMATURE CELLS  115398: NORMAL
LYMPHOCYTES # BLD AUTO: 2.1 X10E3/UL (ref 0.7–3.1)
LYMPHOCYTES NFR BLD AUTO: 32 %
MCH RBC QN AUTO: 31.7 PG (ref 26.6–33)
MCHC RBC AUTO-ENTMCNC: 34.2 G/DL (ref 31.5–35.7)
MCV RBC AUTO: 93 FL (ref 79–97)
MONOCYTES # BLD AUTO: 0.6 X10E3/UL (ref 0.1–0.9)
MONOCYTES NFR BLD AUTO: 9 %
MORPHOLOGY BLD-IMP: NORMAL
NEUTROPHILS # BLD AUTO: 3.8 X10E3/UL (ref 1.4–7)
NEUTROPHILS NFR BLD AUTO: 58 %
NRBC BLD AUTO-RTO: NORMAL %
PLATELET # BLD AUTO: 170 X10E3/UL (ref 150–379)
POTASSIUM SERPL-SCNC: 4.8 MMOL/L (ref 3.5–5.2)
PROT SERPL-MCNC: 7.1 G/DL (ref 6–8.5)
RBC # BLD AUTO: 4.35 X10E6/UL (ref 3.77–5.28)
SODIUM SERPL-SCNC: 137 MMOL/L (ref 134–144)
T4 FREE SERPL-MCNC: 1.02 NG/DL (ref 0.82–1.77)
WBC # BLD AUTO: 6.6 X10E3/UL (ref 3.4–10.8)

## 2018-11-29 ENCOUNTER — OFFICE VISIT (OUTPATIENT)
Dept: PEDIATRIC ENDOCRINOLOGY | Facility: MEDICAL CENTER | Age: 23
End: 2018-11-29
Payer: MEDICARE

## 2018-11-29 VITALS
BODY MASS INDEX: 22.93 KG/M2 | HEART RATE: 82 BPM | HEIGHT: 61 IN | WEIGHT: 121.47 LBS | DIASTOLIC BLOOD PRESSURE: 52 MMHG | SYSTOLIC BLOOD PRESSURE: 90 MMHG

## 2018-11-29 DIAGNOSIS — G40.909 SEIZURE DISORDER (HCC): ICD-10-CM

## 2018-11-29 DIAGNOSIS — E23.0 HYPOPITUITARISM (HCC): ICD-10-CM

## 2018-11-29 DIAGNOSIS — R62.0 DELAYED MILESTONE IN CHILDHOOD: ICD-10-CM

## 2018-11-29 DIAGNOSIS — R94.7 ABNORMAL RESULTS OF OTHER ENDOCRINE FUNCTION STUDIES: ICD-10-CM

## 2018-11-29 DIAGNOSIS — C71.8 MALIGNANT NEOPLASM OF OVERLAPPING SITES OF BRAIN (HCC): ICD-10-CM

## 2018-11-29 DIAGNOSIS — R62.50 UNSPECIFIED LACK OF EXPECTED NORMAL PHYSIOLOGICAL DEVELOPMENT IN CHILDHOOD: ICD-10-CM

## 2018-11-29 PROCEDURE — 99214 OFFICE O/P EST MOD 30 MIN: CPT | Performed by: PEDIATRICS

## 2018-11-29 RX ORDER — NORETHINDRONE ACETATE AND ETHINYL ESTRADIOL 1MG-20(21)
1 KIT ORAL DAILY
Qty: 28 TAB | Refills: 6 | Status: SHIPPED | OUTPATIENT
Start: 2018-11-29 | End: 2019-06-11 | Stop reason: SDUPTHER

## 2018-11-29 ASSESSMENT — PATIENT HEALTH QUESTIONNAIRE - PHQ9
3. TROUBLE FALLING OR STAYING ASLEEP OR SLEEPING TOO MUCH: NOT AT ALL
CLINICAL INTERPRETATION OF PHQ2 SCORE: 1
6. FEELING BAD ABOUT YOURSELF - OR THAT YOU ARE A FAILURE OR HAVE LET YOURSELF OR YOUR FAMILY DOWN: SEVERAL DAYS
SUM OF ALL RESPONSES TO PHQ QUESTIONS 1-9: 5
5. POOR APPETITE OR OVEREATING: NOT AT ALL
9. THOUGHTS THAT YOU WOULD BE BETTER OFF DEAD, OR OF HURTING YOURSELF: SEVERAL DAYS
1. LITTLE INTEREST OR PLEASURE IN DOING THINGS: SEVERAL DAYS
7. TROUBLE CONCENTRATING ON THINGS, SUCH AS READING THE NEWSPAPER OR WATCHING TELEVISION: NOT AT ALL
SUM OF ALL RESPONSES TO PHQ9 QUESTIONS 1 AND 2: 2
8. MOVING OR SPEAKING SO SLOWLY THAT OTHER PEOPLE COULD HAVE NOTICED. OR THE OPPOSITE, BEING SO FIGETY OR RESTLESS THAT YOU HAVE BEEN MOVING AROUND A LOT MORE THAN USUAL: NOT AT ALL
2. FEELING DOWN, DEPRESSED, IRRITABLE, OR HOPELESS: SEVERAL DAYS
4. FEELING TIRED OR HAVING LITTLE ENERGY: SEVERAL DAYS

## 2018-11-29 NOTE — PROGRESS NOTES
Subjective:     Chief Complaint   Patient presents with   • Follow-Up   • Hypopituitarism       HPI  Carrier Millsann Georgina Paredes is a 23 y.o. 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  She underwent a partial resection of the tumor followed by chemotherapy, cranial spinal irradiation (unknown dose) and ultimately an autologous stem cell transplant.  Subsequent to that, she has had issues with significant seizure activity as well as hypopituitarism.  Additionally, she had primary ovarian failure as well.    Currently, she has TSH as well as ACTH deficiencies but primary ovarian failure.  In the past as well as most recently, her growth and levels have been normal.  She has never been on growth and on in the past.  She currently is on levothyroxine, hydrocortisone and alternating doses of Premarin and Provera separately.  Many many years ago she was on a birth control pill but had significant breakthrough bleeding.  However in the visit today the family did not recall that.  We did talk about trying to switch her back to birth control pills just for ease of use as well as improvement in physiology with the goal of trying to be as close to normal physiology as possible.    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  Now they state that she has not had a seizure in about a year.  However, she did have a very significant seizure which was grand mal in nature a few years ago.    They state that her adherence with medications has been good.  At the time of her grand mal seizure somehow she had been off of her seizure meds for about a week.  The patient herself is putting these in pill boxes with some oversight by her mom.  They state however that she gets all of her pills.    She has also improved from a developmental and cognitive standpoint.  She is now working 3 hours a day at a  and really enjoys this.  She is not going to high  school any longer.  When she is not working, she walks her dogs, travels with her mom, etc.    Orders Only on 11/17/2018   Component Date Value Ref Range Status   • WBC 11/17/2018 6.6  3.4 - 10.8 x10E3/uL Final   • RBC 11/17/2018 4.35  3.77 - 5.28 x10E6/uL Final   • Hemoglobin 11/17/2018 13.8  11.1 - 15.9 g/dL Final   • Hematocrit 11/17/2018 40.4  34.0 - 46.6 % Final   • MCV 11/17/2018 93  79 - 97 fL Final   • MCH 11/17/2018 31.7  26.6 - 33.0 pg Final   • MCHC 11/17/2018 34.2  31.5 - 35.7 g/dL Final   • RDW 11/17/2018 13.7  12.3 - 15.4 % Final   • Platelet Count 11/17/2018 170  150 - 379 x10E3/uL Final   • Neutrophils-Polys 11/17/2018 58  Not Estab. % Final   • Lymphocytes 11/17/2018 32  Not Estab. % Final   • Monocytes 11/17/2018 9  Not Estab. % Final   • Eosinophils 11/17/2018 1  Not Estab. % Final   • Basophils 11/17/2018 0  Not Estab. % Final   • Immature Cells 11/17/2018 CANCELED   Final    Result canceled by the ancillary   • Neutrophils (Absolute) 11/17/2018 3.8  1.4 - 7.0 x10E3/uL Final   • Lymphs (Absolute) 11/17/2018 2.1  0.7 - 3.1 x10E3/uL Final   • Monos (Absolute) 11/17/2018 0.6  0.1 - 0.9 x10E3/uL Final   • Eos (Absolute) 11/17/2018 0.1  0.0 - 0.4 x10E3/uL Final   • Baso (Absolute) 11/17/2018 0.0  0.0 - 0.2 x10E3/uL Final   • Immature Granulocytes 11/17/2018 0  Not Estab. % Final   • Immature Granulocytes (abs) 11/17/2018 0.0  0.0 - 0.1 x10E3/uL Final   • Nucleated RBC 11/17/2018 CANCELED   Final    Result canceled by the ancillary   • Comments-Diff 11/17/2018 CANCELED   Final    Result canceled by the ancillary   • Glucose 11/17/2018 82  65 - 99 mg/dL Final   • Bun 11/17/2018 12  6 - 20 mg/dL Final   • Creatinine 11/17/2018 0.73  0.57 - 1.00 mg/dL Final   • GFR If Non  11/17/2018 117  >59 mL/min/1.73 Final   • GFR If  11/17/2018 135  >59 mL/min/1.73 Final   • Bun-Creatinine Ratio 11/17/2018 16  9 - 23 Final   • Sodium 11/17/2018 137  134 - 144 mmol/L Final   • Potassium  11/17/2018 4.8  3.5 - 5.2 mmol/L Final   • Chloride 11/17/2018 99  96 - 106 mmol/L Final   • Co2 11/17/2018 26  20 - 29 mmol/L Final   • Calcium 11/17/2018 9.0  8.7 - 10.2 mg/dL Final   • Total Protein 11/17/2018 7.1  6.0 - 8.5 g/dL Final   • Albumin 11/17/2018 4.2  3.5 - 5.5 g/dL Final   • Globulin 11/17/2018 2.9  1.5 - 4.5 g/dL Final   • A-G Ratio 11/17/2018 1.4  1.2 - 2.2 Final   • Total Bilirubin 11/17/2018 <0.2  0.0 - 1.2 mg/dL Final   • Alkaline Phosphatase 11/17/2018 117  39 - 117 IU/L Final   • AST(SGOT) 11/17/2018 18  0 - 40 IU/L Final   • ALT(SGPT) 11/17/2018 12  0 - 32 IU/L Final   • Free T-4 11/17/2018 1.02  0.82 - 1.77 ng/dL Final   • IGF-1 (Somat) 11/17/2018 105  93 - 342 ng/mL Final   • Igfbp-3 11/17/2018 3829  2,855 - 6,559 ug/L Final    Comment: A courtesy copy of this report has been sent to  the patient.         ROS  Constitutional: Negative for fever, chills, weight loss, malaise/fatigue and diaphoresis.   HENT: Negative for congestion, ear discharge, ear pain, hearing loss, nosebleeds, sore throat and tinnitus.   Eyes: Negative for blurred vision, double vision, photophobia, pain, discharge and redness.   Respiratory: Negative for cough, hemoptysis, sputum production, shortness of breath, wheezing and stridor.    Cardiovascular: Negative for chest pain, palpitations, orthopnea, claudication, leg swelling and PND.   Gastrointestinal: Negative for heartburn, nausea, vomiting, abdominal pain, diarrhea, constipation, blood in stool and melena.   Genitourinary: Negative for dysuria, urgency, frequency, hematuria and flank pain.  Musculoskeletal: Negative for myalgias, back pain, joint pain, falls and neck pain.   Skin: Negative for itching and rash.   Neurological: Negative for dizziness, tingling, tremors, sensory change, speech change, focal weakness, seizures, loss of consciousness, weakness and headaches.   Endo/Heme/Allergies: Negative for environmental allergies and polydipsia. Does not  bruise/bleed easily.   Psychiatric/Behavioral: Negative for depression, suicidal ideas, hallucinations, memory loss and substance abuse. The patient is not nervous/anxious and does not have insomnia.     Allergies   Allergen Reactions   • Oxcarbazepine Rash   • Lamotrigine        Current Outpatient Prescriptions   Medication Sig Dispense Refill   • norethindrone-ethinyl estradiol (JUNEL FE 1/20) 1-20 MG-MCG per tablet Take 1 Tab by mouth every day. 28 Tab 6   • hydrocortisone sodium succinate PF (SOLU-CORTEF) 100 MG Recon Soln injection 2 mL by Intravenous route Once for 1 dose. For severe stress 1 Each 2   • hydrocortisone (CORTEF) 5 MG Tab TAKE ONE TABLET BY MOUTH TWICE DAILY 75 Tab 2   • levothyroxine (SYNTHROID) 50 MCG Tab TAKE 1 TAB BY MOUTH EVERY MORNING ON AN EMPTY STOMACH. 30 Tab 4   • phenytoin ER (DILANTIN) 100 MG Cap Take 300 mg by mouth every bedtime.     • folic acid (FOLVITE) 1 MG Tab Take 2 mg by mouth every day.     • clobazam (ONFI) 5 MG Tab tablet Take 15-20 mg by mouth 2 Times a Day. 15 mg QAM  20 mg QPM     • lorazepam (ATIVAN) 2 MG tablet Take 2 mg by mouth as needed (seizures).     • medroxyPROGESTERone (PROVERA) 10 MG TABS Take 5 mg by mouth See Admin Instructions. Days 18-25 each month       No current facility-administered medications for this visit.        Social History     Social History   • Marital status: Single     Spouse name: N/A   • Number of children: N/A   • Years of education: N/A     Occupational History   • Not on file.     Social History Main Topics   • Smoking status: Never Smoker   • Smokeless tobacco: Not on file   • Alcohol use No   • Drug use: No   • Sexual activity: Not on file     Other Topics Concern   • Not on file     Social History Narrative    Adopted from [place] @ 13 months    Graduated [employer] 6/2014, back at school [employer] for vocational training    Also volunteers at [facility] lives with mom and 2 younger adopted sisters (not bio)          Objective:   Blood pressure  "(!) 90/52, pulse 82, height 1.558 m (5' 1.32\"), weight 55.1 kg (121 lb 7.6 oz), last menstrual period 11/25/2018, not currently breastfeeding.    Physical Exam   Constitutional: Mild developmental delay she appears well-developed and well-nourished.  Much improved speaking abilities  Skin: Skin is warm and dry.   Head: Normocephalic and atraumatic.    Eyes: Pupils are equal, round, and reactive to light. No scleral icterus.  Mouth/Throat: Tongue normal. Oropharynx is clear and moist. Posterior pharynx without erythema or exudates.  Neck: Supple, trachea midline. No thyromegaly present.   Cardiovascular: Normal rate and regular rhythm.  Chest: Effort normal. Clear to auscultation throughout. No adventitious sounds.  Abdominal: Soft, non tender, and without distention. Active bowel sounds in all four quadrants. No rebound, guarding, masses or hepatosplenomegaly.  Extremities: No cyanosis, clubbing, erythema, nor edema.   Neurological: she is alert and oriented to person, place, and time. she has normal reflexes.   : Micha 5  Psychiatric: she has a normal mood and affect. her behavior is normal.        Assessment and Plan:   The following treatment plan was discussed:     1. Hypopituitarism (HCC)    - norethindrone-ethinyl estradiol (JUNEL FE 1/20) 1-20 MG-MCG per tablet; Take 1 Tab by mouth every day.  Dispense: 28 Tab; Refill: 6  - hydrocortisone sodium succinate PF (SOLU-CORTEF) 100 MG Recon Soln injection; 2 mL by Intravenous route Once for 1 dose. For severe stress  Dispense: 1 Each; Refill: 2    2. Unspecified lack of expected normal physiological development in childhood      3. Delayed milestone in childhood      4. 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 Abnormal results of other endocrine function studies      6. Seizure disorder (HCC)    Overall, the patient is doing very well from a cognitive standpoint as well as activities of daily living and hypopituitarism.  We did talk " extensively today about switching her to oral contraceptives for a more physiologic endocrine replacement and they are willing to try this again.  Therefore, we will stop the Premarin and Provera and switch her back to an oral contraceptive as noted above.  Her labs in November were completely normal and therefore we will continue her on her present dose of thyroid medication.    We again went over Solu-Cortef and prescriptions were renewed.  Stress Steroid Coverage  Your child’s body does not produce enough of certain hormones that help with stress placed on her body.  Therefore, she should be given extra hydrocortisone in times of stress.  If she has a fever >100.5, an illness that is making her sicker than a simple cold or is vomiting or has diarrhea, administer three times her usual dose of hydrocortisone by mouth.  Give her triple her usual dose until the fever resolves and you feel that she is improving.    If she vomits the dose, wait 30 minutes and give it to her again.  If she still vomits the dose give her an injection of SoluCortef as discussed in the office.  It is extremely important that she get the extra hydrocortisone dose when she is ill.  If she requires surgery, admission to the hospital, breaks a bone or is vomiting she must receive SoluCortef - a high dose of hydrocortisone that is in an injection form.  SoluCortef comes in a vial as a powder which you must mix with sterile water and give as an intramuscular injection.  If she is hospitalized, the dose will be given through an intravenous catheter.          Follow-Up: Return in about 6 months (around 5/29/2019).

## 2018-12-10 ENCOUNTER — HOSPITAL ENCOUNTER (OUTPATIENT)
Dept: INFUSION CENTER | Facility: MEDICAL CENTER | Age: 23
End: 2018-12-10
Attending: NEUROLOGICAL SURGERY
Payer: MEDICARE

## 2018-12-10 ENCOUNTER — HOSPITAL ENCOUNTER (OUTPATIENT)
Dept: RADIOLOGY | Facility: MEDICAL CENTER | Age: 23
End: 2018-12-10
Attending: NEUROLOGICAL SURGERY
Payer: MEDICARE

## 2018-12-10 VITALS — HEART RATE: 77 BPM | TEMPERATURE: 97 F | RESPIRATION RATE: 20 BRPM | OXYGEN SATURATION: 96 %

## 2018-12-10 DIAGNOSIS — D49.6 BRAIN NEOPLASM (HCC): ICD-10-CM

## 2018-12-10 PROCEDURE — 700117 HCHG RX CONTRAST REV CODE 255: Performed by: NEUROLOGICAL SURGERY

## 2018-12-10 PROCEDURE — 99212 OFFICE O/P EST SF 10 MIN: CPT | Mod: 25

## 2018-12-10 PROCEDURE — 70553 MRI BRAIN STEM W/O & W/DYE: CPT

## 2018-12-10 PROCEDURE — A9585 GADOBUTROL INJECTION: HCPCS | Performed by: NEUROLOGICAL SURGERY

## 2018-12-10 RX ORDER — GADOBUTROL 604.72 MG/ML
5.5 INJECTION INTRAVENOUS ONCE
Status: COMPLETED | OUTPATIENT
Start: 2018-12-10 | End: 2018-12-10

## 2018-12-10 RX ADMIN — GADOBUTROL 5.5 ML: 604.72 INJECTION INTRAVENOUS at 07:59

## 2018-12-17 DIAGNOSIS — E23.0 PANHYPOPITUITARISM (HCC): ICD-10-CM

## 2018-12-17 RX ORDER — HYDROCORTISONE 5 MG/1
TABLET ORAL
Qty: 75 TAB | Refills: 5 | Status: SHIPPED | OUTPATIENT
Start: 2018-12-17 | End: 2019-06-11 | Stop reason: SDUPTHER

## 2019-05-01 ENCOUNTER — OFFICE VISIT (OUTPATIENT)
Dept: NEUROLOGY | Facility: MEDICAL CENTER | Age: 24
End: 2019-05-01
Payer: MEDICARE

## 2019-05-01 VITALS
SYSTOLIC BLOOD PRESSURE: 100 MMHG | BODY MASS INDEX: 22.47 KG/M2 | HEART RATE: 68 BPM | TEMPERATURE: 97.5 F | DIASTOLIC BLOOD PRESSURE: 78 MMHG | OXYGEN SATURATION: 98 % | RESPIRATION RATE: 12 BRPM | WEIGHT: 119 LBS | HEIGHT: 61 IN

## 2019-05-01 DIAGNOSIS — R56.9 SEIZURES (HCC): ICD-10-CM

## 2019-05-01 DIAGNOSIS — C71.9: ICD-10-CM

## 2019-05-01 DIAGNOSIS — G40.019 LOCALIZATION-RELATED (FOCAL) (PARTIAL) IDIOPATHIC EPILEPSY AND EPILEPTIC SYNDROMES WITH SEIZURES OF LOCALIZED ONSET, INTRACTABLE, WITHOUT STATUS EPILEPTICUS (HCC): ICD-10-CM

## 2019-05-01 PROCEDURE — 99205 OFFICE O/P NEW HI 60 MIN: CPT

## 2019-05-01 ASSESSMENT — PATIENT HEALTH QUESTIONNAIRE - PHQ9
5. POOR APPETITE OR OVEREATING: 0 - NOT AT ALL
CLINICAL INTERPRETATION OF PHQ2 SCORE: 4
SUM OF ALL RESPONSES TO PHQ QUESTIONS 1-9: 8

## 2019-05-01 NOTE — PROGRESS NOTES
Miles Paredes is a 23 y.o. 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 She underwent a partial resection of the tumor followed by chemotherapy, craniospinal radiation (unknown dose) and ultimately autologous stem cell transplant at [place], [place].  She also has primary ovarian failure with significantly elevated FSH and LH and is on Provera. 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  Patient is here with her mother to establish care with neurology. Since her tumor was diagnosed she had been suffering from epilepsy with generalized tonic clonic seizures leading to left hippocampectomy due to intractable seizures in 2015 in [place] . This had helped her seizures however she continued to have staring spells where she would be unresponsive and stop talking for a few minutes. She also had 2-3 Jacksonian seizures in the past however none recently.  She had tried numerous AEDs in the pas and mom remembers some of them: LEV gave her confusion and she could not tolerate the medication, LTG gave her skin rash and she was told she can never use it as she is  and numerous other medications that she can not recall at this time until settling with Onfi 20 mg daily and Dilantin 300 mg daily.  She had been seizure free for > 1 year.   She is here today to establish care with neurology.  Her most recent dilantin level was 24 and her PCP had urged her to come here. She has no toxicity symptoms. No fast uncontrollable eye movements, no dizziness or clumsiness, no nausea or vomiting, no alteration of consciousness. Feels her  skin is hypersentsitive.  Mom and patient would like for her to switched to another AED and not take dilantin anymore.  She is doing well apart from R homonymous hemianopia and R sided hypoesthesia and mild hearing loss which were all  consequences from her debulking.  She also has right foot drop and wears a brace which helps with her step.  She finished high school and works 2-3 hours per day in a . She enjoys her job.She lives with her mother and two sisters in [place].  Of note- she had febrile seizures as a baby and she had been adopted from [place] at age 13 months.    Review of Systems   Neurological: Positive for seizures.     Past Medical History:   Diagnosis Date   • Cancer (HCC)     PNET tumor   • Febrile seizures (HCC)     until age 6 yr   • H/O stem cell transplant (HCC)    • Hypopituitarism (HCC)    • Poor short term memory    • Primary ovarian failure    • Radiation exposure     craniospinal, and tumor resection   • Right foot drop    • Seizures (HCC)     started in January 2nd to chemo   • Supratentorial PNET (HCC)     ds 6/2009   • Unspecified hemorrhagic conditions     bone marrow transplant     Past Surgical History:   Procedure Laterality Date   • PB BONE MARROW/STEM XPLANT,ALLOGENIC  12/22/09   • OTHER NEUROLOGICAL SURG      tumor resection x2, 6/28/09 & 11/20/09   • WI PROTON BEAM DEL,SIMPLE      completed 30 rounds radiation      Family History   Problem Relation Age of Onset   • Family history unknown: Yes     Social History     Social History   • Marital status: Single     Spouse name: N/A   • Number of children: N/A   • Years of education: N/A     Occupational History   • Not on file.     Social History Main Topics   • Smoking status: Never Smoker   • Smokeless tobacco: Never Used   • Alcohol use No   • Drug use: No   • Sexual activity: Not on file     Other Topics Concern   • Not on file     Social History Narrative    Adopted from [place] @ 13 months    Graduated [employer]  6/2014, back at school [employer] for vocational training    Also volunteers at [employer] lives with mom and 2 younger adopted sisters (not bio)     Allergies  Oxcarbazepine and Lamotrigine    Family History  Unknown   Adopted from [place] ate age 13 months      Physical exam     Slow speech  Affect appropriate  Much more robust appearance  PERRL, EOMI, no facial asymmetry  Constitutional: Well-developed, well-nourished, good hygiene. Appears stated age.  Cardiovascular: RRR, with no murmurs, rubs or gallops. No carotid bruits.   Respiratory: Lungs CTA B/L, no W/R/R.   Abdomen: Soft Non-tender to Palpation. Non-distended.  Extremities: No peripheral edema, pedal pulses intact.   Skin: Warm, dry, intact. No rashes observed.  Eyes: Sclera anicteric   Funduscopic: Optic discs flat with no evidence of papilledema or pallor.   Neurologic:   Mental Status: Awake, alert, oriented x 3.  Slow speech appropriate affect    Speech: slow speech normal output    Memory: Able to recall recent and remote events accurately.    Concentration: Attentive. Able to focus on history and follow multi-step commands.              Fund of Knowledge: Appropriate   Cranial Nerves:    CN II: PERRL. No afferent pupillary defect.  R homonymous hemianopia     CN III, IV, VI: Good eye closure, EOMI.     CN V: Facial sensation decreased to PP on right israel face      CN VII: No facial asymmetry.     CN VIII: Hearing decreased on the right side     CN IX and X: Palate elevates symmetrically. Normal gag reflex.    CN XI: Symmetric shoulder shrug.     CN XII: Tongue midline.    Sensory: Intact light touch, vibration and temperature with exception of decreased PP over RHB   Coordination: No evidence of past-pointing on finger to nose testing, no dysdiadochokinesia. Heel to shin intact.             DTR's: 2+ throughout without clonus.    Babinski: Toes downgoing bilaterally.   Romberg: Negative.   Movements: No tremors observed.   Musculoskeletal:    Strength: 5/5 in upper and lower extremities Left  with 4+/5 in RUE and RLE    Gait: steady gait, narrow based   Tone: Normal bulk and tone.   Joints: No swelling.     Imaging and labs  MRI brain     FINDINGS:  There are multifocal encephalomalacia in the left  temporal lobe, left posterior thalamus, left parietal and occipital lobes. Gradient echo images demonstrates chronic multifocal hemosiderin deposition. There is ex vacuo dilatation of the left lateral   ventricle. A few nonspecific punctate T2 hyperintensities are noted in the subcortical white matter. There is Wallerian degeneration of left cerebral peduncle. There is no evidence of abnormal contrast enhancement. There are changes secondary to the left   temporoparietal craniotomy. There is no abnormal extradural fluid collection.    There is no acute infarct. There is no extra-axial fluid collection, hemorrhage or mass. There is no sellar or juxtasellar lesion. The visualized flow voids of the cerebral vasculature are unremarkable.  There is no large lesion identified in the   expected course of the intracranial portions of the cranial nerves.    The paranasal sinuses are clear.   Impression       1.  There is no recurrent lesion.  2.  Stable postsurgical changes.  3.  Multifocal areas of stable encephalomalacia with chronic hemosiderin deposition in the left temporal, parietal and occipital lobes and left thalamus.  4.  Stable Nonspecific T2 hyperintensities in the frontal white matter likely representing nonspecific foci of gliosisMaribell Paredes is a 23 y.o. RH woman with a history of PNET, who underwent resection, chemo and radiation and s/p left temporal lobectomy for refractory partial epilepsy. She has been doing well from an epilepsy standpoint, with her last partial complex seizure being more than one year ago. She was following [facility] in [place] and is here to transfer care for management of her epilepsy.  She has surveillance MRI every 12 months and her most recent MRI brain which was performed in 12/2018 did not show recurrence of her tumor.  Her most recent Dilantin level was 24 (no free level was done) and she is on Dilantin 300 mg nightly.  There are no symptoms of  dilantin toxicity and no signs on exam. She has skin hypesthesia which I believe is unrelated to dilantin level.  She continues clobazam 20 mg daily and remains seizure free for over one year now.  Patient and her mom would like to discontinue dilantin and start another antiepileptic.  She had tried Oxcarbazepine, levetiracetam and lamotrigine in the past with multiple side effects and intolerance.  Plan:  1.Given Dilantin level of 24 will decrease dose of Dilantin to 200 mg nightly for 2-3 days and repeat free and total dilantin levels.The goal level of dilantin is between 10-20.  2. Given complex nature of her disease she will need comprehensive epilepsy evaluation including EEG  3. I will order awake and asleep routine EEG at this time  4. LTG is preferred in women of reproductive age however given prior rash and risk of Nelson Sanjiv's syndrome I do not think this would be a good option for her at this time.  5. Possibly switch from Dilantin to lacosamide (Vimpat) however Vimpat is indicated for treatment of indicated for the adjunctive treatment of partial-onset seizures and I am not sure if this would work for her generalized onset seizures.  6.Given high complexity and need for an epileptologist I will refer faithann to  Our epileptologist Dr Herbert for further assessment and workup.      Patricia Gordon MD PhD   Behavioral Neurologist   Board certified Neurologist     Patient was seen for 60 minutes face to face of which > 50% of appointment time was spent on counseling and coordination of care regarding the above.

## 2019-05-03 ASSESSMENT — ENCOUNTER SYMPTOMS: SEIZURES: 1

## 2019-05-03 NOTE — PATIENT INSTRUCTIONS
Epilepsy  Epilepsy is when a person keeps having seizures. A seizure is unusual activity in the brain. A seizure can change how you think or behave, and it can make it hard to be aware of what is happening.  This condition can cause problems, such as:  · Falls, accidents, and injury.  · Depression.  · Poor memory.  · Sudden unexplained death in epilepsy (SUDEP). This is rare. Its cause is not known.  Most people with epilepsy lead normal lives.  Follow these instructions at home:  Medicines  · Take medicines only as told by your doctor.  · Avoid anything that may keep your medicine from working, such as alcohol.  Activity  · Get enough rest. Lack of sleep can make seizures more likely to occur.  · Follow your doctor’s advice about driving, swimming, and doing anything else that would be dangerous if you had a seizure.  Teaching others   Teach friends and family what to do if you have a seizure. They should:  · Lay you on the ground to prevent a fall.  · Cushion your head and body.  · Loosen any tight clothing around your neck.  · Turn you on your side.  · Stay with you until you are better.  · Not hold you down.  · Not put anything in your mouth.  · Know whether or not you need emergency care.  General instructions  · Avoid anything that causes you to have seizures.  · Keep a seizure diary. Write down what you remember about each seizure, and especially what might have caused it.  · Keep all follow-up visits as told by your doctor. This is important.  Contact a doctor if:  · You have a change in your seizure pattern.  · You get an infection or start to feel sick. You may have more seizures when you are sick.  Get help right away if:  · A seizure does not stop after 5 minutes.  · You have more than one seizure in a row, and you do not have enough time between the seizures to feel better.  · A seizure makes it harder to breathe.  · A seizure is different from other seizures you have had.  · A seizure makes you unable  to speak or use a part of your body.  · You did not wake up right after a seizure.  This information is not intended to replace advice given to you by your health care provider. Make sure you discuss any questions you have with your health care provider.  Document Released: 10/15/2010 Document Revised: 07/24/2017 Document Reviewed: 06/27/2017  [employer] Interactive Patient Education © 2017 [employer]

## 2019-05-04 LAB — PHENYTOIN SERPL-MCNC: 17.8 UG/ML (ref 10–20)

## 2019-05-06 ENCOUNTER — TELEPHONE (OUTPATIENT)
Dept: NEUROLOGY | Facility: MEDICAL CENTER | Age: 24
End: 2019-05-06

## 2019-05-06 NOTE — TELEPHONE ENCOUNTER
Pt mom called and LVM requesting lab results since pt has decreased DILANTIN from 200mg nightly 2-3 days -- she wanted to know if pt needs to be on 200mg or 300mg of DILANTIN every night. Please advise and I will call back, thank you.

## 2019-05-07 ENCOUNTER — TELEPHONE (OUTPATIENT)
Dept: NEUROLOGY | Facility: MEDICAL CENTER | Age: 24
End: 2019-05-07

## 2019-05-07 DIAGNOSIS — G40.109 LOCALIZATION-RELATED EPILEPSY (HCC): ICD-10-CM

## 2019-05-07 NOTE — TELEPHONE ENCOUNTER
Spoke to pt mom and advised her that Dr. Gordon and Dr. Herbert had discussed that the best plan of care is for pt to proceed with 5 day video EEG/EMU admission on 5/20/19 at 6am -- I also advised that pt will be seeing Dr. Herbert in the hospital since he does not have a sooner appt for regular FV. Pt mom refused because she cannot afford to miss 5 days of work and it will be a great financial issue for both her and pt. Dr. Herbert will see the pt during his next available FV and to be scheduled on next available 5 day video EEG appointment.

## 2019-05-09 ENCOUNTER — TELEPHONE (OUTPATIENT)
Dept: NEUROLOGY | Facility: MEDICAL CENTER | Age: 24
End: 2019-05-09

## 2019-05-09 NOTE — TELEPHONE ENCOUNTER
----- Message from Tatiana Cota sent at 5/8/2019  7:45 AM PDT -----  Please advise Mom re-scheduled to 7/8/19 this is the next available post 6/11/19. She can call me directly if there are questions. [phone].  Thank you   ----- Message -----  From: Sayda Gotti, Med Ass't  Sent: 5/7/2019   3:45 PM  To: Tatiana Cota    #anytime after June 11th    Pt mom called again.    Thank you

## 2019-05-09 NOTE — TELEPHONE ENCOUNTER
Called and LVM to pt mom # and advised her on documentation below -- I stated to call [facility] to discuss further details of 5 day EEG.

## 2019-05-15 ENCOUNTER — TELEPHONE (OUTPATIENT)
Dept: NEUROLOGY | Facility: MEDICAL CENTER | Age: 24
End: 2019-05-15

## 2019-05-15 NOTE — TELEPHONE ENCOUNTER
Patient's mother lvm to me stating she had to cancel pt's eeg for next week and that Dr SERRATO wanted pt to establish care with Dr Herbert. I called mom back lvm to call and reschedule the eeg and get on the wait and list and provided out office number to call and schedule a N2U appointment with Dr Herbert. I left my direct number for any questions.

## 2019-05-20 ENCOUNTER — TELEPHONE (OUTPATIENT)
Dept: NEUROLOGY | Facility: MEDICAL CENTER | Age: 24
End: 2019-05-20

## 2019-05-20 NOTE — TELEPHONE ENCOUNTER
I returned mother's call in regards to a refill on pt medications. She did not state which one and also pt needs to schedule an appointment to see Dr Herbert, Dr SERRATO did refer pt to him.

## 2019-05-20 NOTE — TELEPHONE ENCOUNTER
Per Dr crowe I called pt pharmacy in chart poke with pharmacist gave verbal to fill Onfi 10 mg tab take 15 mg tab in a.m and 20 mg tab in evening w/ refills. Pharmacist verbally understood.

## 2019-06-11 ENCOUNTER — OFFICE VISIT (OUTPATIENT)
Dept: PEDIATRIC ENDOCRINOLOGY | Facility: MEDICAL CENTER | Age: 24
End: 2019-06-11
Payer: MEDICARE

## 2019-06-11 VITALS
HEART RATE: 89 BPM | DIASTOLIC BLOOD PRESSURE: 66 MMHG | SYSTOLIC BLOOD PRESSURE: 98 MMHG | BODY MASS INDEX: 22.48 KG/M2 | HEIGHT: 61 IN | WEIGHT: 119.05 LBS

## 2019-06-11 DIAGNOSIS — R62.0 DELAYED MILESTONE IN CHILDHOOD: ICD-10-CM

## 2019-06-11 DIAGNOSIS — C71.8 MALIGNANT NEOPLASM OF OVERLAPPING SITES OF BRAIN (HCC): ICD-10-CM

## 2019-06-11 DIAGNOSIS — R62.50 UNSPECIFIED LACK OF EXPECTED NORMAL PHYSIOLOGICAL DEVELOPMENT IN CHILDHOOD: ICD-10-CM

## 2019-06-11 DIAGNOSIS — E23.0 HYPOPITUITARISM (HCC): ICD-10-CM

## 2019-06-11 DIAGNOSIS — R94.7 ABNORMAL RESULTS OF OTHER ENDOCRINE FUNCTION STUDIES: ICD-10-CM

## 2019-06-11 DIAGNOSIS — E23.0 PANHYPOPITUITARISM (HCC): ICD-10-CM

## 2019-06-11 PROCEDURE — 99214 OFFICE O/P EST MOD 30 MIN: CPT | Performed by: PEDIATRICS

## 2019-06-11 RX ORDER — NORETHINDRONE ACETATE AND ETHINYL ESTRADIOL 1MG-20(21)
1 KIT ORAL DAILY
Qty: 28 TAB | Refills: 6 | Status: SHIPPED | OUTPATIENT
Start: 2019-06-11 | End: 2019-12-12 | Stop reason: SDUPTHER

## 2019-06-11 RX ORDER — HYDROCORTISONE 5 MG/1
5 TABLET ORAL 2 TIMES DAILY
Qty: 70 TAB | Refills: 6 | Status: SHIPPED | OUTPATIENT
Start: 2019-06-11 | End: 2019-12-12 | Stop reason: SDUPTHER

## 2019-06-11 RX ORDER — LEVOTHYROXINE SODIUM 0.05 MG/1
50 TABLET ORAL
Qty: 30 TAB | Refills: 4 | Status: SHIPPED | OUTPATIENT
Start: 2019-06-11 | End: 2019-12-12 | Stop reason: SDUPTHER

## 2019-06-11 NOTE — PROGRESS NOTES
"Subjective:     Chief Complaint   Patient presents with   • Follow-Up   • Hypopituitarism       Past endocrine history:  Miles Paredes is a 23 y.o. 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  She underwent a partial resection of the tumor followed by chemotherapy, cranial spinal irradiation (unknown dose) and ultimately an autologous stem cell transplant.  Subsequent to that, she has had issues with significant seizure activity as well as hypopituitarism.  Additionally, she had primary ovarian failure as well.     Currently, she has TSH as well as ACTH deficiencies but primary ovarian failure.  In the past as well as most recently, her growth and levels have been normal.  She has never been on growth and on in the past.  She currently is on levothyroxine, hydrocortisone and alternating doses of Premarin and Provera separately.  Many many years ago she was on a birth control pill but had significant breakthrough bleeding.  However in the visit today the family did not recall that.  We did talk about trying to switch her back to birth control pills just for ease of use as well as improvement in physiology with the goal of trying to be as close to normal physiology as possible.     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  Now they state that she has not had a seizure in about a year.  However, she did have a very significant seizure which was grand mal in nature a few years ago.     History of present illness:    She continues on her replacement hormones including hydrocortisone, Synthroid and oral contraceptives.  She has had the most issues as of late with her Dilantin level being too high.  Her neurologist is helping to wean this dose down a little bit.  Mom states that this presented with \"skin pain\" and feeling very tired at which point they did labs and discover this.  The labs also showed evidence of a " past EBV infection but not any current evidence.    Her most recent labs also showed a normal free T4.  Please see the scanned EMR data otherwise, her other labs were done in November 2018, all of which were normal.    It was difficult to tease out what her cycles are actually doing in terms of any breakthrough bleeding.  She thinks that she is having some spotting or patrick periods on around the second or third week of her PACs.  However is not exactly clear how consistent her adherence is.  She is at this point in charge of her own medications although her mom oversees it to some extent.    She has not had a seizure in over 3 years fortunately.  Otherwise, her general health is been good.  She is not wearing a medic alert bracelet which we again discussed that she really needs to have that as well as make her work aware that she needs hydrocortisone or Solu-Cortef and immediate attention should she have a significant injury or illness.    They were also given handouts again today with regards to when to use Solu-Cortef, triple doses, etc.    Social history: The patient lives with her mom as well as siblings.  She does work in a  about 3 hours a day.    Orders Only on 05/03/2019   Component Date Value Ref Range Status   • Phenytoin(Dilantin),S 05/03/2019 17.8  10.0 - 20.0 ug/mL Final    Comment: Detection Limit =  0.8  <0.8 Indicates None Detected  A courtesy copy of this report has been sent to  the patient.         ROS  Constitutional: Negative for fever, chills, weight loss, malaise/fatigue and diaphoresis.   HENT: Negative for congestion, ear discharge, ear pain, hearing loss, nosebleeds, sore throat and tinnitus.   Eyes: Negative for blurred vision, double vision, photophobia, pain, discharge and redness.   Respiratory: Negative for cough, hemoptysis, sputum production, shortness of breath, wheezing and stridor.    Cardiovascular: Negative for chest pain, palpitations, orthopnea, claudication, leg  swelling and PND.   Gastrointestinal: Negative for heartburn, nausea, vomiting, abdominal pain, diarrhea, constipation, blood in stool and melena.   Genitourinary: Negative for dysuria, urgency, frequency, hematuria and flank pain.  Musculoskeletal: Negative for myalgias, back pain, joint pain, falls and neck pain.   Skin: Negative for itching and rash.   Neurological: Negative for dizziness, tingling, tremors, sensory change, speech change, focal weakness, seizures, loss of consciousness, weakness and headaches.   Endo/Heme/Allergies: Negative for environmental allergies and polydipsia. Does not bruise/bleed easily.   Psychiatric/Behavioral: Negative for depression, suicidal ideas, hallucinations, memory loss and substance abuse. The patient is not nervous/anxious and does not have insomnia.     Allergies   Allergen Reactions   • Oxcarbazepine Rash   • Lamotrigine        Current Outpatient Prescriptions   Medication Sig Dispense Refill   • hydrocortisone sodium succinate PF (SOLU-CORTEF) 100 MG Recon Soln injection 2 mL by Injection route Once for 1 dose. 1 Each 3   • levothyroxine (SYNTHROID) 50 MCG Tab Take 1 Tab by mouth Every morning on an empty stomach. 30 Tab 4   • norethindrone-ethinyl estradiol (JUNEL FE 1/20) 1-20 MG-MCG per tablet Take 1 Tab by mouth every day. 28 Tab 6   • hydrocortisone (CORTEF) 5 MG Tab Take 1 Tab by mouth 2 Times a Day. Take one tablet by mouth twice daily and triple dose for illness or other stress 70 Tab 6   • phenytoin ER (DILANTIN) 100 MG Cap Take 0-200 mg by mouth every bedtime.     • folic acid (FOLVITE) 1 MG Tab Take 2 mg by mouth every day.     • clobazam (ONFI) 5 MG Tab tablet Take 15-20 mg by mouth 2 Times a Day. 15 mg QAM  20 mg QPM     • lorazepam (ATIVAN) 2 MG tablet Take 2 mg by mouth as needed (seizures).     • medroxyPROGESTERone (PROVERA) 10 MG TABS Take 5 mg by mouth See Admin Instructions. Days 18-25 each month       No current facility-administered medications for  "this visit.        Social History     Social History   • Marital status: Single     Spouse name: N/A   • Number of children: N/A   • Years of education: N/A     Occupational History   • Not on file.     Social History Main Topics   • Smoking status: Never Smoker   • Smokeless tobacco: Never Used   • Alcohol use No   • Drug use: No   • Sexual activity: Not on file     Other Topics Concern   • Not on file     Social History Narrative    Adopted from [place] @ 13 months    Graduated [employer]  6/2014, back at school [employer] for vocational training    Also volunteers at [facility] lives with mom and 2 younger adopted sisters (not bio)          Objective:   BP (!) 98/66 (BP Location: Left arm, Patient Position: Sitting)   Pulse 89   Ht 1.557 m (5' 1.31\")   Wt 54 kg (119 lb 0.8 oz)     Physical Exam   Constitutional: she is oriented to person, place, and time. she appears well-developed and well-nourished.  Skin: Skin is warm and dry.   Head: Normocephalic and atraumatic.    Eyes: Pupils are equal, round, and reactive to light. No scleral icterus.  Mouth/Throat: Tongue normal. Oropharynx is clear and moist. Posterior pharynx without erythema or exudates.  Neck: Supple, trachea midline. No thyromegaly present.   Cardiovascular: Normal rate and regular rhythm.  Chest: Effort normal. Clear to auscultation throughout. No adventitious sounds.  Abdominal: Soft, non tender, and without distention. Active bowel sounds in all four quadrants. No rebound, guarding, masses or hepatosplenomegaly.  Extremities: No cyanosis, clubbing, erythema, nor edema.   Neurological: she is alert and oriented to person, place, and time. she has normal reflexes.   : Micha  Psychiatric: she has a normal mood and affect. her behavior is normal. Judgment and thought content normal.       Assessment and Plan:   The following treatment plan was discussed:     1. Hypopituitarism (HCC)  At this point, she is replaced on the hormones that she is deficient in, namely " thyroid, hydrocortisone and estrogen.  We will continue her on her current doses of all these medications.  We do know that her thyroid level is normal and therefore we will repeat the rest of her labs in another 6 months or so.  However the exception to that visit we will check her IGF-I since she is going to be getting a Dilantin level as well.    2. Unspecified lack of expected normal physiological development in childhood  In the past, she has had short stature as well as poor growth and pubertal maturation as well as cognitive functioning all secondary to her brain tumor, chemotherapy and radiation associated with that.    3. Delayed milestone in childhood  See #2    4. 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    5. Abnormal results of other endocrine function studies  Currently, her endocrine labs have normalized and we will continue her on her current doses.  Again urged her to get another medical alert bracelet and wear that at all times.    Stress Steroid Coverage  Your child’s body does not produce enough of certain hormones that help with stress placed on her body.  Therefore, she should be given extra hydrocortisone in times of stress.  If she has a fever >100.5, an illness that is making her sicker than a simple cold or is vomiting or has diarrhea, administer three times her usual dose of hydrocortisone by mouth.  Give her triple her usual dose until the fever resolves and you feel that she is improving.    If she vomits the dose, wait 30 minutes and give it to her again.  If she still vomits the dose give her an injection of SoluCortef as discussed in the office.  It is extremely important that she get the extra hydrocortisone dose when she is ill.  If she requires surgery, admission to the hospital, breaks a bone or is vomiting she must  receive SoluCortef - a high dose of hydrocortisone that is in an injection form.  SoluCortef comes in a vial as a powder which you must mix with sterile water and give as an intramuscular injection.  If she is hospitalized, the dose will be given through an intravenous catheter.                Follow-Up: Return in about 6 months (around 12/11/2019) for ke.

## 2019-06-18 ENCOUNTER — TELEPHONE (OUTPATIENT)
Dept: NEUROLOGY | Facility: MEDICAL CENTER | Age: 24
End: 2019-06-18

## 2019-06-18 NOTE — TELEPHONE ENCOUNTER
Spoke with pt this am to discuss change in EMU date. New date confirmed for 8/19/2019 for 5 day EMU stay.  Letter sent.

## 2019-08-17 NOTE — PROGRESS NOTES
Future [facility] Ad[facility]. Dr. Herbert is the Referring and Accepting Physician for Epilepsy. All signed and held orders will need to be released upon patient arrival on 8/19/19 at 0600 for Video EEG Monitoring. Patient arriving via private vehicle.

## 2019-08-18 RX ORDER — IBUPROFEN 400 MG/1
400 TABLET ORAL EVERY 6 HOURS PRN
Status: CANCELLED | OUTPATIENT
Start: 2019-08-18

## 2019-08-18 RX ORDER — LORAZEPAM 2 MG/ML
2 INJECTION INTRAMUSCULAR
Status: CANCELLED | OUTPATIENT
Start: 2019-08-18

## 2019-08-18 RX ORDER — LORAZEPAM 2 MG/ML
1 INJECTION INTRAMUSCULAR
Status: CANCELLED | OUTPATIENT
Start: 2019-08-18

## 2019-08-19 ENCOUNTER — HOSPITAL ENCOUNTER (OUTPATIENT)
Facility: MEDICAL CENTER | Age: 24
DRG: 101 | End: 2019-08-19
Admitting: PSYCHIATRY & NEUROLOGY
Payer: MEDICARE

## 2019-08-19 ENCOUNTER — HOSPITAL ENCOUNTER (INPATIENT)
Facility: MEDICAL CENTER | Age: 24
LOS: 4 days | DRG: 101 | End: 2019-08-23
Attending: PSYCHIATRY & NEUROLOGY | Admitting: PSYCHIATRY & NEUROLOGY
Payer: MEDICARE

## 2019-08-19 DIAGNOSIS — G40.219 LOCALIZATION-RELATED EPILEPSY WITH COMPLEX PARTIAL SEIZURES WITH INTRACTABLE EPILEPSY (HCC): ICD-10-CM

## 2019-08-19 DIAGNOSIS — G93.89 ENCEPHALOMALACIA ON IMAGING STUDY: ICD-10-CM

## 2019-08-19 DIAGNOSIS — Z87.898 HISTORY OF BRAIN TUMOR: ICD-10-CM

## 2019-08-19 DIAGNOSIS — G40.219 PHARMACORESISTANT PARTIAL EPILEPSY WITH IMPAIRMENT OF CONSCIOUSNESS, INTRACTABLE (HCC): Chronic | ICD-10-CM

## 2019-08-19 PROCEDURE — A9270 NON-COVERED ITEM OR SERVICE: HCPCS | Performed by: PSYCHIATRY & NEUROLOGY

## 2019-08-19 PROCEDURE — 700102 HCHG RX REV CODE 250 W/ 637 OVERRIDE(OP): Performed by: PSYCHIATRY & NEUROLOGY

## 2019-08-19 PROCEDURE — 95951 HCHG EEG-VIDEO-24HR: CPT | Performed by: PSYCHIATRY & NEUROLOGY

## 2019-08-19 PROCEDURE — 99223 1ST HOSP IP/OBS HIGH 75: CPT | Mod: 25,AI | Performed by: PSYCHIATRY & NEUROLOGY

## 2019-08-19 PROCEDURE — 770020 HCHG ROOM/CARE - TELE (206)

## 2019-08-19 PROCEDURE — 95951 HCHG PF EEG-VIDEO-24HR: CPT | Mod: 26 | Performed by: PSYCHIATRY & NEUROLOGY

## 2019-08-19 RX ORDER — FOLIC ACID 1 MG/1
2 TABLET ORAL DAILY
Status: DISCONTINUED | OUTPATIENT
Start: 2019-08-19 | End: 2019-08-23 | Stop reason: HOSPADM

## 2019-08-19 RX ORDER — CLOBAZAM 10 MG/1
15 TABLET ORAL EVERY MORNING
Status: DISCONTINUED | OUTPATIENT
Start: 2019-08-19 | End: 2019-08-23 | Stop reason: HOSPADM

## 2019-08-19 RX ORDER — LORAZEPAM 2 MG/ML
2 INJECTION INTRAMUSCULAR
Status: DISCONTINUED | OUTPATIENT
Start: 2019-08-19 | End: 2019-08-23 | Stop reason: HOSPADM

## 2019-08-19 RX ORDER — HYDROCORTISONE 5 MG/1
5 TABLET ORAL 2 TIMES DAILY
Status: DISCONTINUED | OUTPATIENT
Start: 2019-08-19 | End: 2019-08-23 | Stop reason: HOSPADM

## 2019-08-19 RX ORDER — LORAZEPAM 2 MG/ML
1 INJECTION INTRAMUSCULAR
Status: DISCONTINUED | OUTPATIENT
Start: 2019-08-19 | End: 2019-08-23 | Stop reason: HOSPADM

## 2019-08-19 RX ORDER — MEDROXYPROGESTERONE ACETATE 10 MG/1
5 TABLET ORAL
Status: DISCONTINUED | OUTPATIENT
Start: 2019-08-19 | End: 2019-08-23 | Stop reason: HOSPADM

## 2019-08-19 RX ORDER — CLOBAZAM 10 MG/1
20 TABLET ORAL EVERY EVENING
Status: DISCONTINUED | OUTPATIENT
Start: 2019-08-19 | End: 2019-08-23 | Stop reason: HOSPADM

## 2019-08-19 RX ORDER — PHENYTOIN SODIUM 100 MG/1
100 CAPSULE, EXTENDED RELEASE ORAL NIGHTLY
Status: DISCONTINUED | OUTPATIENT
Start: 2019-08-19 | End: 2019-08-20

## 2019-08-19 RX ORDER — IBUPROFEN 800 MG/1
400 TABLET ORAL EVERY 6 HOURS PRN
Status: DISCONTINUED | OUTPATIENT
Start: 2019-08-19 | End: 2019-08-23 | Stop reason: HOSPADM

## 2019-08-19 RX ORDER — CLOBAZAM 10 MG/1
15-20 TABLET ORAL 2 TIMES DAILY
Status: DISCONTINUED | OUTPATIENT
Start: 2019-08-19 | End: 2019-08-19

## 2019-08-19 RX ORDER — LEVOTHYROXINE SODIUM 0.05 MG/1
50 TABLET ORAL
Status: DISCONTINUED | OUTPATIENT
Start: 2019-08-19 | End: 2019-08-23 | Stop reason: HOSPADM

## 2019-08-19 RX ADMIN — CLOBAZAM 20 MG: 10 TABLET ORAL at 21:32

## 2019-08-19 RX ADMIN — HYDROCORTISONE 5 MG: 5 TABLET ORAL at 21:32

## 2019-08-19 RX ADMIN — CLOBAZAM 15 MG: 10 TABLET ORAL at 10:27

## 2019-08-19 RX ADMIN — LEVOTHYROXINE SODIUM 50 MCG: 50 TABLET ORAL at 09:45

## 2019-08-19 RX ADMIN — HYDROCORTISONE 5 MG: 5 TABLET ORAL at 10:41

## 2019-08-19 RX ADMIN — PHENYTOIN SODIUM 100 MG: 100 CAPSULE ORAL at 21:32

## 2019-08-19 RX ADMIN — FOLIC ACID 2 MG: 1 TABLET ORAL at 10:41

## 2019-08-19 ASSESSMENT — LIFESTYLE VARIABLES
HAVE PEOPLE ANNOYED YOU BY CRITICIZING YOUR DRINKING: NO
HAVE YOU EVER FELT YOU SHOULD CUT DOWN ON YOUR DRINKING: NO
TOTAL SCORE: 0
TOTAL SCORE: 0
EVER FELT BAD OR GUILTY ABOUT YOUR DRINKING: NO
CONSUMPTION TOTAL: NEGATIVE
ON A TYPICAL DAY WHEN YOU DRINK ALCOHOL HOW MANY DRINKS DO YOU HAVE: 0
EVER HAD A DRINK FIRST THING IN THE MORNING TO STEADY YOUR NERVES TO GET RID OF A HANGOVER: NO
DOES PATIENT WANT TO STOP DRINKING: NO
ALCOHOL_USE: NO
EVER_SMOKED: NEVER
AVERAGE NUMBER OF DAYS PER WEEK YOU HAVE A DRINK CONTAINING ALCOHOL: 0
TOTAL SCORE: 0
HOW MANY TIMES IN THE PAST YEAR HAVE YOU HAD 5 OR MORE DRINKS IN A DAY: 0

## 2019-08-19 ASSESSMENT — COGNITIVE AND FUNCTIONAL STATUS - GENERAL
MOBILITY SCORE: 24
SUGGESTED CMS G CODE MODIFIER MOBILITY: CH
SUGGESTED CMS G CODE MODIFIER DAILY ACTIVITY: CH
DAILY ACTIVITIY SCORE: 24

## 2019-08-19 ASSESSMENT — PATIENT HEALTH QUESTIONNAIRE - PHQ9
SUM OF ALL RESPONSES TO PHQ9 QUESTIONS 1 AND 2: 0
2. FEELING DOWN, DEPRESSED, IRRITABLE, OR HOPELESS: NOT AT ALL
1. LITTLE INTEREST OR PLEASURE IN DOING THINGS: NOT AT ALL
SUM OF ALL RESPONSES TO PHQ9 QUESTIONS 1 AND 2: 0
1. LITTLE INTEREST OR PLEASURE IN DOING THINGS: NOT AT ALL

## 2019-08-19 NOTE — PROGRESS NOTES
Bedside rounding complete.  · Patient a & o x 4.   · Numbness to right side of body(baseline)  · Right homonymous hemianopia  (baseline)  · Continuous EEG monitoring for 5 days  · Tele monitoring  · Passed dysphagia screen; regular diet  · BM 8/18/19  · Patient ambulated 50 ft with no assistance. Gait steady.  · Patient and mother oriented to room, surroundings and call bell. Frame alarm on. Bed in lowest position, locked and upper side rails up. Patient demonstrated correct use of call bell. Call bell/belongings within reach. Patient educated on hourly rounding.

## 2019-08-19 NOTE — H&P
Chief complaint: Seizures, history of brain tumor.      History of present illness:  Miles Paredes 24 y.o. female presents today with her mother for elective epilepsy monitoring unit admission.  The patient provides a history of having been diagnosed with left temporal PNET in 2009.  She underwent resection, radiation and chemo.  She has yearly MRIs, and continues to be on remission.  Last MRI was in December 2018.  A few weeks after the diagnosis of the left temporal tumor, she started having seizures.  Her seizures were always focal, most of them only with expressive aphasia, some associated with confusion and, only one generalized tonic-clonic seizure about 3 years ago, patient had stopped all her medications at that time for about a week prior to the seizure.  Her last episode of aphasia/seizure was about 2 years ago.  The patient has tried multiple medications including levetiracetam and (confusion), lamotrigine (rash).  The patient has been on Dilantin for years, also on clobazam.  She takes Dilantin 300 mg nightly, but her level was in the toxic range (although the patient had no signs of toxicity).  The patient and the mother are curious about possibly coming off Dilantin.  They are afraid of long-term side effects.  She is also on clobazam 50 mg in the morning and 20 mg at bedtime, and denies any side effects.  As a result of her left temporal resection, the patient developed a chronic, stable right homonymous hemianopia.    The patient has had some symptoms of chronic depression, but denies any current worsening including no elements for suicidal or homicidal ideation.    The patient also follows with endocrinology.    She does not drive.  She indicates is not sexually active.    The patient has been referred to the epilepsy monitoring unit, to rule out subclinical seizures, and possibly take the patient off Dilantin.      Past medical history:   Past Medical History:   Diagnosis Date   •  Cancer (HCC)     PNET tumor   • Febrile seizures (HCC)     until age 6 yr   • H/O stem cell transplant (HCC)    • Hypopituitarism (HCC)    • Poor short term memory    • Primary ovarian failure    • Radiation exposure     craniospinal, and tumor resection   • Right foot drop    • Seizures (HCC)     started in January 2nd to chemo   • Supratentorial PNET (HCC)     ds 6/2009   • Unspecified hemorrhagic conditions     bone marrow transplant       Past surgical history:   Past Surgical History:   Procedure Laterality Date   • PB BONE MARROW/STEM XPLANT,ALLOGENIC  12/22/09   • OTHER NEUROLOGICAL SURG      tumor resection x2, 6/28/09 & 11/20/09   • MN PROTON BEAM DEL,SIMPLE      completed 30 rounds radiation        Family history:   Family History   Family history unknown: Yes       Social history:   Social History     Socioeconomic History   • Marital status: Single     Spouse name: Not on file   • Number of children: Not on file   • Years of education: Not on file   • Highest education level: Not on file   Occupational History   • Not on file   Social Needs   • Financial resource strain: Not on file   • Food insecurity:     Worry: Not on file     Inability: Not on file   • Transportation needs:     Medical: Not on file     Non-medical: Not on file   Tobacco Use   • Smoking status: Never Smoker   • Smokeless tobacco: Never Used   Substance and Sexual Activity   • Alcohol use: No   • Drug use: No   • Sexual activity: Not on file   Lifestyle   • Physical activity:     Days per week: Not on file     Minutes per session: Not on file   • Stress: Not on file   Relationships   • Social connections:     Talks on phone: Not on file     Gets together: Not on file     Attends [employer] service: Not on file     Active member of club or organization: Not on file     Attends meetings of clubs or organizations: Not on file     Relationship status: Not on file   • Intimate partner violence:     Fear of current or ex partner: Not on file      Emotionally abused: Not on file     Physically abused: Not on file     Forced sexual activity: Not on file   Other Topics Concern   • Not on file   Social History Narrative    Adopted from [place] @ 13 months    Graduated [employer] 6/2014, back at school [employer] for vocational training    Also volunteers at [facility] lives with mom and 2 younger adopted sisters (not bio)       Current medications:   Current Facility-Administered Medications   Medication Dose   • ibuprofen (MOTRIN) tablet 400 mg  400 mg   • LORazepam (ATIVAN) injection 1 mg  1 mg    Or   • LORazepam (ATIVAN) injection 2 mg  2 mg   • folic acid (FOLVITE) tablet 2 mg  2 mg   • hydrocortisone (CORTEF) tablet 5 mg  5 mg   • levothyroxine (SYNTHROID) tablet 50 mcg  50 mcg   • medroxyPROGESTERone (PROVERA) tablet 5 mg  5 mg   • phenytoin ER (DILANTIN) capsule 100 mg  100 mg   • clobazam (ONFI) tablet 15 mg  15 mg   • clobazam (ONFI) tablet 20 mg  20 mg       Medication Allergy:  Allergies   Allergen Reactions   • Oxcarbazepine Rash   • Lamotrigine          Review of systems:   Constitutional: denies fever, night sweats, weight loss, or malaise/fatigue.   Eyes: denies acute vision change, eye pain or secretion.   Ears, Nose, Mouth, Throat: denies nasal secretion, nasal bleeding, difficulty swallowing, hearing loss, tinnitus, vertigo, ear pain, oral ulcers or lesions.   Endocrine: denies recent weight changes, heat or cold intolerance, polyuria, polydypsia, polyphagia,abnormal hair growth.  Cardiovascular: denies new onset of chest pain, palpitations, syncope, lower extremity edema, or dyspnea of exertion.  Pulmonary: denies shortness of breath, new onset of cough, hemoptysis, wheezing, chest pain or flu-like symptoms.   GI: denies nausea, vomiting, diarrhea, GI bleeding, change in appetite, abdominal pain, and change in bowel habits.  : denies urinary incontinence, retention or hematuria.  Heme/oncology: denies history of easy bruising or bleeding.    Allergy/immunology: denies hives/urticarial.  Dermatologic: denies new rash.  Musculoskeletal:denies joint swelling or pain, muscle pain, neck and back pain. Neurologic: denies frequent headaches, acute visual changes, facial droopiness, paresthesias, ataxia, change in speech or language, memory loss, abnormal movements, seizures, loss of consciousness, or episodes of confusion.   Psychiatric: denies symptoms of depression, anxiety, hallucinations, mood swings or changes, suicidal or homicidal thoughts.     Physical examination:   Vitals:    08/19/19 0700   BP: 109/70   Pulse: 94   Resp: 18   Temp: 37 °C (98.6 °F)   TempSrc: Temporal   SpO2: 94%   Weight: 52.2 kg (115 lb)     General: Patient in no acute distress, pleasant and cooperative.  HEENT: Normocephalic, no signs of acute trauma.   Neck: supple, no meningeal signs or carotid bruits. There is normal range of motion. No tenderness on exam.   Chest: clear to auscultation. No cough.   CV: RRR, no murmurs.   Skin: no signs of acute rashes or trauma.   Musculoskeletal: joints exhibit full range of motion, without any pain to palpation. There are no signs of joint or muscle swelling. There is no tenderness to deep palpation of muscles.   Psychiatric: No hallucinatory behavior. Denies symptoms of depression or suicidal ideation. Mood and affect appear normal on exam.     NEUROLOGICAL EXAM:   Mental status, orientation: Awake, alert and fully oriented.   Speech and language: speech is clear and fluent. The patient is able to name, repeat and comprehend.   Memory: There is intact recollection of recent and remote events.   Cranial nerve exam: Pupils are 3-4 mm bilaterally and equally reactive to light and accommodation.  Right homonymous hemianopia.  There is no nystagmus on primary or secondary gaze. Intact full EOM in all directions of gaze. Face appears symmetric. Sensation in the face is intact to light touch. Uvula is midline. Palate elevates symmetrically.  Tongue is midline and without any signs of tongue biting or fasciculations.Sternocleidomastoid muscles exhibit is normal strength bilaterally. Shoulder shrug is intact bilaterally.   Motor exam: Strength is 5/5 in all extremities, except for mild 4+ out of 5 weakness in the proximal right lower extremity. Tone is normal. No abnormal movements were seen on exam.   Sensory exam reveals hypoesthesia on the right sided extremities.   Deep tendon reflexes:  2+ throughout. Plantar responses are flexor. There is no clonus.   Coordination: shows a normal finger-nose-finger. Normal rapidly alternating movements.   Gait: Deferred.      ANCILLARY DATA REVIEWED:       Lab Data Review:  Reviewed in chart.    Records reviewed:   Chart reviewed. Dr. Gordon discussed case with tme.     Imaging:   MRI brain with and without, 12/11/2018:  1.  There is no recurrent lesion.  2.  Stable postsurgical changes.  3.  Multifocal areas of stable encephalomalacia with chronic hemosiderin deposition in the left temporal, parietal and occipital lobes and left thalamus.  4.  Stable Nonspecific T2 hyperintensities in the frontal white matter likely representing nonspecific foci of gliosis.  (I personally reviewed images).    EEG:  Video EEG, 4/14/2010:  CLINICAL INTERPRETATION:  This is abnormal video EEG monitoring for  patient of this age.  The patient has continuous ictal discharges in  the left hemisphere consisting of spike and wave and polyspike and  wave discharges with a frequency of 1 Hz.  There was no response to  propofol and phenobarbital during this recording.  Clinical  correlation is required.        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  History of left temporal PNET, on remission, followed by yearly MRIs.  Last MRI showed no recurrent lesions (12/11/2018).  Status post resection, chemo and radiation.  Area of encephalomalacia likely dysfunctional and etiology for her epileptic spells.  Has  not had a seizure in about 2 years.  The patient is currently on Dilantin 300 mg nightly (most recent level was toxic).  The patient is also on clobazam 15 mg in the morning and 20 mg at bedtime.  The patient and mother are interested in coming off Dilantin, they are afraid of long-term side effects.  The patient will be sleep deprived today, and we will lowered the Dilantin to 100 mg nightly.  Both patient and mother understand the risk for seizures, including their consequences.  They have decided to proceed with the plan as outlined.    The patient indicates that she is not sexually active.  We discussed recommendation for dual contraception and folic acid.    The patient does not drive.    Nature of admission was discussed, including fall precautions.  Patient is aware she must ambulate with assistance all the time.  Patient will be on continuous pulse oximetry and continuous telemetry.    Seizure precautions.    Her right sided hypoesthesia and right homonymous hemianopia are chronic and postsurgical.        FOLLOW-UP:   In my office, after emu discharge.       EDUCATION AND COUNSELING:  -Education was provided to the patient and/or family regarding diagnosis and prognosis. The chronic and unpredictable nature of the condition were discussed. There is increased risk for additional events, which may carry potential for significant injuries and death. Discussed frequent seizure triggers: sleep deprivation, medication non-compliance, use of illegal drugs/alcohol, stress, and others.   -We reviewed in detail the current antiepileptic regimen. Potential side effects of antiepileptics were discussed at length, including but no limited to: hypersensitivity reactions (rash and others, some of which can be fatal), visual field changes (some of which may be irreversible), glaucoma, diplopia, kidney stones, osteopenia/osteoporosis/bone fractures, hyperthermia/anhydrosis, hyponatremia, tremors/abnormal movements, ataxia,  dizziness, fatigue, increased risk for falls, risk for cardiac arrhythmias/syncope, gastrointestinal side effects(hepatitis, pancreatitis, gastritis, ulcers), gingival hypertrophy/bleeding, drowsiness, sedation, anxiety/nervousness, increased risk for suicide, increased risk for depression, and psychosis.   -We also reviewed drug-drug interactions and their potential effect on seizure control and medication side effects.    -We also discussed in detail potential effects of seizures, epilepsy, and medications during pregnancy, including but not limited to fetal malformations, child developmental/intellectual disability, fetal/ risk for hemorrhages, stillbirth, maternal death, premature birth, and others. The patient/family aware that pregnancy should be avoided, unless desired, in which case we recommend discussing with us at least a year prior to planned conception. To avoid undesired pregnancy while on antiepileptics, we recommend dual contraception.  The patient denies being sexually active.  -Folic acid 2 mg is recommended for all females in childbearing age (12-44 years of age).   -Recommend chronic vitamin D supplementation and regular exercise (if not contraindicated).   -Patient/family educated on risk for SUDEP (Sudden Death in Epilepsy). Counseling was provided on the importance of strict medication and follow up compliance. The patient/family understand the risks associated with non-adherence with the medical plan as outlined, including but not limited to an increased risk for breakthrough seizures, which may contribute to injuries, disability, status epilepticus, and even death.   -Counseling was also provided on potential effects of alcohol and other drugs, which may lower seizure threshold and/or affect the metabolism of antiepileptic drugs. We recommend avoidance of alcohol and illegal drugs.  Patient denies use.  -Avoid sleep deprivation.   -We extensively discussed the aspects related to  safety in drivers who suffer from epilepsy. The patient is encourage to report to the Division of Motor Vehicles of any condition and/or spells related to confusion, disorientation, and/or loss of awareness and/or loss of consciousness; as these may pose a safety issue if they occur while operating a motor vehicle. The patient and/or family are ultimately responsible for exercising caution and abiding to regulations in place.  The patient does not drive.  -Other seizure precautions were discussed at length, including no diving, no skydiving, no climbing or exposure to unprotected heights, no unsupervised swimming, no Jacuzzi or bathing in bathtubs or deep bodies of water. The patient/family have been advised about risks for operating any machinery while suffering from seizures / syncope / epilepsy and/or while taking antiepileptic drugs.   -The patient understands and agrees that due to the complexity of his/her diagnosis, results of any testing and further recommendations will typically be discussed/made during a face to face encounter in my office. The patient and/or family further understands it is their responsibility to keep proper follow up.     Patient/family agree with plan, as outlined.         Alberto Herbert MD   Epilepsy and Neurodiagnostics.   Clinical  of Neurology [facility].   Diplomate in Neurology, Epilepsy, and Electrodiagnostic Medicine.   Office: [phone]  Fax: [phone]      BILLING DOCUMENTATION:     Counseling:  I spent greater than 50% time face-to-face time of a total of 73 minutes visit. Over 50% of the time of the visit today was spent on counseling and or coordination of care wtih the patient and/or family, with greater than 50% of the total discussing my assessment and plan as stated above.

## 2019-08-19 NOTE — PROGRESS NOTES
2 RN skin check completed with Sakina WALKER  Blister to 5th digit on left hand. Intact. Cleansed with NS and bandaid applied.  Bruising to BLE  No other skin breakdown noted.

## 2019-08-19 NOTE — CARE PLAN
Problem: Communication  Goal: The ability to communicate needs accurately and effectively will improve  Outcome: PROGRESSING AS EXPECTED  Intervention: Wilson patient and significant other/support system to call light to alert staff of needs  Flowsheets (Taken 8/19/2019 1126)  Oriented to:: All of the Following : Location of Bathroom, Visiting Policy, Unit Routine, Call Light and Bedside Controls, Bedside Rail Policy, Smoking Policy, Rights and Responsibilities, Bedside Report, and Patient Education Notebook; Bedside Rail Policy; Location of bathroom; Smoking Policy; Visiting Policy; Patient Rights and Responsibilities; Unit Routine; Patient Education Notebook; Call Light & Bedside Controls; Bedside Report  Note:   Pt oriented to room, call light and policies.   Intervention: Educate patient and significant other/support system about the plan of care, procedures, treatments, medications and allow for questions  Flowsheets (Taken 8/19/2019 1126)  Pt & Family Have Been Educated on Methods Available to Report Concerns Related to Care, Treatment, Services, and Patient Safety Issues: Yes  Note:   Plan of care discussed with patient and mother. Patient agreeable to plan.     Problem: Safety  Goal: Will remain free from injury  Outcome: PROGRESSING AS EXPECTED  Intervention: Provide assistance with mobility  Flowsheets  Taken 8/19/2019 1126 by Em Zhou RMECCA  Assistance: Standby Assist  Taken 8/19/2019 0700 by Garcia Santiago  Ambulation Tolerance: Tolerates Well  Note:   Frame alarm on. Pt must always be with staff when OOB. Pt verbalized understanding and calls appropriately.

## 2019-08-19 NOTE — PROCEDURES
VIDEO ELECTROENCEPHALOGRAM / EPILEPSY MONITORING UNIT REPORT      Referring provider: Dr. Patricia Gordon.     DOS:   8/19/2019 -8/23/2019    INDICATION:  Miles Paredes 24 y.o. female presenting with seizures, history of brain tumor, encephalomalacia.    CURRENT ANTIEPILEPTIC REGIMEN: Clobazam to 15 mg in the a.m. and 20 mg nightly, phenytoin 300 mg nightly.     TECHNIQUE: A 30-channel, 5 days video electroencephalogram (VEEG) was performed in accordance with the international 10-20 system. This digital study was reviewed in bipolar and referential montages. The recording examined the patient during wakeful, drowsy and sleep states.     The patient was sleep deprived to 4 hrs overnight sleep only and no day time naps on: 8/19/2019, 8/20/2019, and 8/21/2019.   There was supervised withdrawal of the following medications: Phenytoin was lowered to 100 mg nightly on the day of admission, and then discontinued on day two of admission.     DESCRIPTION OF THE RECORD:  During the awake state, background shows symmetrical 10 Hz alpha activity posteriorly with amplitude of 70 mV.  There was reactivity with eye opening/closure.  Normal anterior-posterior gradient was noted with faster beta frequencies seen anteriorly.  During drowsiness, high-amplitude delta frequencies were seen.    During the sleep state, background shows diffuse high-amplitude 4-5 Hz delta activity.  Symmetrical high-amplitude sleep spindles and vertex sharp activities were seen in the central leads.    ACTIVATION PROCEDURES:   Hyperventilation was performed by the patient for a total of 3 minutes. The technician performing the test noted good effort. This technique failed to produce any significant electroencephalographic changes.     Intermittent Photic stimulation was performed in a stepwise fashion from 1 to 30 Hz, but failed to produce any background changes.      ICTAL AND/OR INTERICTAL FINDINGS:   There is intermittent slowing in the  left hemisphere and frequent left frontotemporal sharps noted. There are intermittent runs of up 8 seconds long of rhythmic theta activity in the frontal regions, most pronounced on the left. A seizure was captured during the study, see below.     EVENT(S):    8/21/2019 @ 15:47:   Patient pushed event button while lying supine in bed. She felt heaviness in the right arm and had trouble talking, and felt mildly off. There was no jerking or convulsions. The seizure lasted about three minutes. Review of the eeg showed rhythmic theta/delta activity in the left posterior quadrant, progressive to rhythmic spikes and slow waves in the same region, then spreading to the entire left hemisphere and subsequently to the right posterior quadrant. There is mild left hemisphere slowing post ictally. A mild sinus tachycardia was noted during the spell.     EKG: sampling review of EKG recording demonstrated sinus rhythm.       INTERPRETATION:   This is an abnormal 5 days video electroencephalogram recording in the awake, drowsy and sleep state. There is intermittent slowing in the left hemisphere with frequent left frontotemporal sharps noted. There are intermittent runs of up 8 seconds long of rhythmic theta activity in the frontal regions, most pronounced on the left. A seizure was captured during the study, reported by patient as heavy sensation on the right arm, difficulties with words and feeling mildly off.  These events was in keeping with a left hemisphere focal non-motor seizure, arising from the left posterior quadrant. The findings confirm diagnosis of focal onset epilepsy and suggest underlying areas of cortical irritability and structural abnormality. Clinical and radiological correlation is recommended.        Alberto Herbert MD   Epilepsy and Neurodiagnostics.   Clinical  of Neurology [facility].   Diplomate in Neurology, Epilepsy, and Electrodiagnostic  Medicine.   Office: [phone]  Fax: [phone]      Total daily recordings:  8/19/2019: 21 Hours and 27 minutes  8/20/2019: 23 Hours and 34 minutes  8/21/2019: 23 Hours and 56 minutes  8/22/2019: 23 Hours of 20 minutes  8/23/2019: 6 Hours and 32 minutes.

## 2019-08-20 PROCEDURE — 700102 HCHG RX REV CODE 250 W/ 637 OVERRIDE(OP): Performed by: PSYCHIATRY & NEUROLOGY

## 2019-08-20 PROCEDURE — A9270 NON-COVERED ITEM OR SERVICE: HCPCS | Performed by: PSYCHIATRY & NEUROLOGY

## 2019-08-20 PROCEDURE — 95951 HCHG EEG-VIDEO-24HR: CPT | Performed by: PSYCHIATRY & NEUROLOGY

## 2019-08-20 PROCEDURE — 95951 HCHG PF EEG-VIDEO-24HR: CPT | Mod: 26 | Performed by: PSYCHIATRY & NEUROLOGY

## 2019-08-20 PROCEDURE — 99233 SBSQ HOSP IP/OBS HIGH 50: CPT | Mod: 25 | Performed by: PSYCHIATRY & NEUROLOGY

## 2019-08-20 PROCEDURE — 770020 HCHG ROOM/CARE - TELE (206)

## 2019-08-20 RX ORDER — M-VIT,TX,IRON,MINS/CALC/FOLIC 27MG-0.4MG
1 TABLET ORAL DAILY
Status: DISCONTINUED | OUTPATIENT
Start: 2019-08-20 | End: 2019-08-23 | Stop reason: HOSPADM

## 2019-08-20 RX ADMIN — FOLIC ACID 2 MG: 1 TABLET ORAL at 06:26

## 2019-08-20 RX ADMIN — MULTIPLE VITAMINS W/ MINERALS TAB 1 TABLET: TAB at 14:14

## 2019-08-20 RX ADMIN — CLOBAZAM 15 MG: 10 TABLET ORAL at 06:26

## 2019-08-20 RX ADMIN — HYDROCORTISONE 5 MG: 5 TABLET ORAL at 08:29

## 2019-08-20 RX ADMIN — LEVOTHYROXINE SODIUM 50 MCG: 50 TABLET ORAL at 06:26

## 2019-08-20 RX ADMIN — HYDROCORTISONE 5 MG: 5 TABLET ORAL at 21:40

## 2019-08-20 RX ADMIN — CLOBAZAM 20 MG: 10 TABLET ORAL at 21:40

## 2019-08-20 NOTE — PROGRESS NOTES
Chief complaint: Seizures, history of brain tumor.      Interim history:  Miles Paredes remains in the [facility]u. Her mother is staying with her. She was sleep deprived last night. No spells so far.  The patient has no complaints.  Her mother is at bedside.  She is using the bathroom, without any difficulties.  She is eating and drinking fluids without any issues.  Her mood is good.    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   • Unspecified hemorrhagic conditions     bone marrow transplant       Past surgical history:   Past Surgical History:   Procedure Laterality Date   • PB BONE MARROW/STEM XPLANT,ALLOGENIC  12/22/09   • OTHER NEUROLOGICAL SURG      tumor resection x2, 6/28/09 & 11/20/09   • IA PROTON BEAM DEL,SIMPLE      completed 30 rounds radiation        Family history:   Family History   Family history unknown: Yes       Social history:   Social History     Socioeconomic History   • Marital status: Single     Spouse name: Not on file   • Number of children: Not on file   • Years of education: Not on file   • Highest education level: Not on file   Occupational History   • Not on file   Social Needs   • Financial resource strain: Not on file   • Food insecurity:     Worry: Not on file     Inability: Not on file   • Transportation needs:     Medical: Not on file     Non-medical: Not on file   Tobacco Use   • Smoking status: Never Smoker   • Smokeless tobacco: Never Used   Substance and Sexual Activity   • Alcohol use: No   • Drug use: No   • Sexual activity: Not on file   Lifestyle   • Physical activity:      Days per week: Not on file     Minutes per session: Not on file   • Stress: Not on file   Relationships   • Social connections:     Talks on phone: Not on file     Gets together: Not on file     Attends Catholic service: Not on file     Active member of club or organization: Not on file     Attends meetings of clubs or organizations: Not on file     Relationship status: Not on file   • Intimate partner violence:     Fear of current or ex partner: Not on file     Emotionally abused: Not on file     Physically abused: Not on file     Forced sexual activity: Not on file   Other Topics Concern   • Not on file   Social History Narrative    Adopted from [place] @ 13 months    Graduated [employer]  6/2014, back at school [employer] for vocational training    Also volunteers at [facility] lives with mom and 2 younger adopted sisters (not bio)       Current medications:   Current Facility-Administered Medications   Medication Dose   • ibuprofen (MOTRIN) tablet 400 mg  400 mg   • LORazepam (ATIVAN) injection 1 mg  1 mg    Or   • LORazepam (ATIVAN) injection 2 mg  2 mg   • folic acid (FOLVITE) tablet 2 mg  2 mg   • hydrocortisone (CORTEF) tablet 5 mg  5 mg   • levothyroxine (SYNTHROID) tablet 50 mcg  50 mcg   • medroxyPROGESTERone (PROVERA) tablet 5 mg  5 mg   • clobazam (ONFI) tablet 15 mg  15 mg   • clobazam (ONFI) tablet 20 mg  20 mg       Medication Allergy:  Allergies   Allergen Reactions   • Oxcarbazepine Rash   • Lamotrigine          Review of systems:   Constitutional: denies fever, night sweats, weight loss, or malaise/fatigue.   Eyes: denies acute vision change, eye pain or secretion.   Ears, Nose, Mouth, Throat: denies nasal secretion, nasal bleeding, difficulty swallowing, hearing loss, tinnitus, vertigo, ear pain, oral ulcers or lesions.   Endocrine: denies recent weight changes, heat or cold intolerance, polyuria, polydypsia, polyphagia,abnormal hair growth.  Cardiovascular: denies new onset of chest pain, palpitations, syncope,  lower extremity edema, or dyspnea of exertion.  Pulmonary: denies shortness of breath, new onset of cough, hemoptysis, wheezing, chest pain or flu-like symptoms.   GI: denies nausea, vomiting, diarrhea, GI bleeding, change in appetite, abdominal pain, and change in bowel habits.  : denies urinary incontinence, retention or hematuria.  Heme/oncology: denies history of easy bruising or bleeding.   Allergy/immunology: denies hives/urticarial.  Dermatologic: denies new rash.  Musculoskeletal:denies joint swelling or pain, muscle pain, neck and back pain. Neurologic: denies headaches, acute visual changes, facial droopiness, muscle weakness (focal or generalized), paresthesias, anesthesia, ataxia, change in speech or language, memory loss, abnormal movements, seizures, loss of consciousness, or episodes of confusion.   Psychiatric: denies symptoms of depression, anxiety, hallucinations, mood swings or changes, suicidal or homicidal thoughts.     Physical examination:   Vitals:    08/19/19 2300 08/20/19 0400 08/20/19 0800 08/20/19 1200   BP: (!) 95/54 (!) 93/49 100/64 105/66   Pulse: (!) 58 61 (!) 58 62   Resp: 16 16 18 18   Temp: 36.7 °C (98 °F) 36.3 °C (97.4 °F) 36.6 °C (97.9 °F) 36 °C (96.8 °F)   TempSrc: Temporal Temporal Temporal Temporal   SpO2: 98% 98% 95% 98%   Weight:       Height:         General: Patient in no acute distress, pleasant and cooperative.  HEENT: Normocephalic, no signs of acute trauma.   Neck: supple, no meningeal signs or carotid bruits. There is normal range of motion. No tenderness on exam.   Chest: clear to auscultation. No cough.   CV: RRR, no murmurs.    Skin: no signs of acute rashes or trauma.   Musculoskeletal: joints exhibit full range of motion, without any pain to palpation. There are no signs of joint or muscle swelling. There is no tenderness to deep palpation of muscles.   Psychiatric: No hallucinatory behavior. Denies symptoms of depression or suicidal ideation. Mood and affect  appear normal on exam.     NEUROLOGICAL EXAM:   Mental status, orientation: Awake, alert and fully oriented.   Speech and language: speech is clear and fluent. The patient is able to name, repeat and comprehend.   Memory: There is intact recollection of recent and remote events.   Cranial nerve exam: Pupils are 3-4 mm bilaterally and equally reactive to light and accommodation. Visual fields are intact by confrontation. There is no nystagmus on primary or secondary gaze. Intact full EOM in all directions of gaze. Face appears symmetric. Sensation in the face is intact to light touch. Uvula is midline. Palate elevates symmetrically. Tongue is midline and without any signs of tongue biting or fasciculations.Sternocleidomastoid muscles exhibit is normal strength bilaterally. Shoulder shrug is intact bilaterally.   Motor exam: Strength is 5/5 in all extremities, with mild weakness on the right lower extremity, chronic. Tone is normal. No abnormal movements were seen on exam.   Sensory exam reveals normal sense of light touch in all extremities.   Deep tendon reflexes:  2+ throughout. Plantar responses are flexor. There is no clonus.   Coordination: shows a normal finger-nose-finger. Normal rapidly alternating movements.   Gait: Deferred.      ANCILLARY DATA REVIEWED:       Lab Data Review:  No results found for this or any previous visit (from the past 24 hour(s)).      Records reviewed:   Chart reviewed.    Imaging:   MRI brain with and without, 12/11/2018:  1.  There is no recurrent lesion.  2.  Stable postsurgical changes.  3.  Multifocal areas of stable encephalomalacia with chronic hemosiderin deposition in the left temporal, parietal and occipital lobes and left thalamus.  4.  Stable Nonspecific T2 hyperintensities in the frontal white matter likely representing nonspecific foci of gliosis.  (I personally reviewed images).     EEG:  Video EEG, 4/14/2010:  CLINICAL INTERPRETATION:  This is abnormal video EEG  monitoring for  patient of this age.  The patient has continuous ictal discharges in  the left hemisphere consisting of spike and wave and polyspike and  wave discharges with a frequency of 1 Hz.  There was no response to  propofol and phenobarbital during this recording.  Clinical  correlation is required.           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  History of left temporal PNET, on remission, followed by yearly MRIs.  Last MRI showed no recurrent lesions (12/11/2018).  Status post resection, chemo and radiation.  Area of encephalomalacia likely dysfunctional and etiology for her epileptic spells.  Has not had a seizure in about 2 years.  The patient was on Dilantin 300 mg nightly at home (most recent level was supratherapeutic).  The patient is also on clobazam 15 mg in the morning and 20 mg at bedtime.  The patient and mother remain interested in coming off Dilantin, they are afraid of long-term side effects.  The patient will be sleep deprived last night, and will be sleep deprived again tonight, and we had lowered the Dilantin yesterday to 100 mg only at bedtime, the patient and mother agreed to hold Dilantin for now, in attempt to capture the typical spell.  Her EEG is abnormal. Both patient and mother understand the risk for seizures, including their consequences.  They have decided to proceed with the plan as outlined.     The patient indicates that she is not sexually active.  We discussed recommendation for dual contraception and folic acid.     The patient does not drive.     Nature of admission was discussed, including fall precautions.  Patient is aware she must ambulate with assistance all the time.  Patient will be on continuous pulse oximetry and continuous telemetry.     Seizure precautions.     Her right sided hypoesthesia and right homonymous hemianopia are chronic and postsurgical.         FOLLOW-UP:   In my office, after discharge from EMU.    EDUCATION  AND COUNSELING:  -Education was provided to the patient and/or family regarding diagnosis and prognosis. The chronic and unpredictable nature of the condition were discussed. There is increased risk for additional events, which may carry potential for significant injuries and death. Discussed frequent seizure triggers: sleep deprivation, medication non-compliance, use of illegal drugs/alcohol, stress, and others.   -We reviewed in detail the current antiepileptic regimen. Potential side effects of antiepileptics were discussed at length, including but no limited to: hypersensitivity reactions (rash and others, some of which can be fatal), visual field changes (some of which may be irreversible), glaucoma, diplopia, kidney stones, osteopenia/osteoporosis/bone fractures, hyperthermia/anhydrosis, hyponatremia, tremors/abnormal movements, ataxia, dizziness, fatigue, increased risk for falls, risk for cardiac arrhythmias/syncope, gastrointestinal side effects(hepatitis, pancreatitis, gastritis, ulcers), gingival hypertrophy/bleeding, drowsiness, sedation, anxiety/nervousness, increased risk for suicide, increased risk for depression, and psychosis.   -We also reviewed drug-drug interactions and their potential effect on seizure control and medication side effects.    -We also discussed in detail potential effects of seizures, epilepsy, and medications during pregnancy, including but not limited to fetal malformations, child developmental/intellectual disability, fetal/ risk for hemorrhages, stillbirth, maternal death, premature birth, and others. The patient/family aware that pregnancy should be avoided, unless desired, in which case we recommend discussing with us at least a year prior to planned conception. To avoid undesired pregnancy while on antiepileptics, we recommend dual contraception.  Patient indicates she is not sexually active.  -Folic acid 2 mg is recommended for all females in childbearing age and  sexually active (12-44 years of age).   -Recommend chronic vitamin D supplementation and regular exercise (if not contraindicated).   -Patient/family educated on risk for SUDEP (Sudden Death in Epilepsy). Counseling was provided on the importance of strict medication and follow up compliance. The patient/family understand the risks associated with non-adherence with the medical plan as outlined, including but not limited to an increased risk for breakthrough seizures, which may contribute to injuries, disability, status epilepticus, and even death.   -Avoid sleep deprivation at home.   -The patient does not drive.  -Other seizure precautions were discussed at length, including no diving, no skydiving, no climbing or exposure to unprotected heights, no unsupervised swimming, no Jacuzzi or bathing in bathtubs or deep bodies of water. The patient/family have been advised about risks for operating any machinery while suffering from seizures / syncope / epilepsy and/or while taking antiepileptic drugs.   -The patient understands and agrees that due to the complexity of his/her diagnosis, results of any testing and further recommendations will typically be discussed/made during a face to face encounter in my office. The patient and/or family further understands it is their responsibility to keep proper follow up.     Patient/family agree with plan, as outlined.         Alberto Herbert MD   Epilepsy and Neurodiagnostics.   Clinical  of Neurology [facility].   Diplomate in Neurology, Epilepsy, and Electrodiagnostic Medicine.   Office: [phone]  Fax: [phone]      BILLING DOCUMENTATION:     I have performed physical exam and reviewed and updated ROS and plan today 8/20/2019. In review of that note, there are no new changes except as documented above.     Counseling:  I spent greater than 50% time face-to-face time of a total of 37 minutes visit. Over  50% of the time of the visit today was spent on counseling and or coordination of care wtih the patient and/or family, with greater than 50% of the total discussing my assessment and plan as stated above.

## 2019-08-20 NOTE — CARE PLAN
Problem: Safety  Goal: Will remain free from falls  Outcome: PROGRESSING AS EXPECTED  Note:   Fall precautions remain in place: treaded socks on pt, appropriate signs on doorway, armbands on pt, IV pole on side of bathroom, lowest bed rails down, bed in lowest position and locked, call light and phone within reach, bed frame alarm on.  EEG sitter present at monitors.      Problem: Venous Thromboembolism (VTW)/Deep Vein Thrombosis (DVT) Prevention:  Goal: Patient will participate in Venous Thrombosis (VTE)/Deep Vein Thrombosis (DVT)Prevention Measures  Outcome: PROGRESSING AS EXPECTED  Note:   SCDs ordered.  Patient refused SCDs at this time, educated regarding importance but continues to refuse at this time.

## 2019-08-20 NOTE — DISCHARGE PLANNING
Anticipated Discharge Disposition: Home    Action: CM spoke with patient who anticipates going home after discharge. She has a PCP and Rx coverage. She wears a brace on the right foot r/t drop foot. She has right sided weakness from the left sided brain tumor. She is independent of ADL's and she does not drive. Mom takes care of her.    Barriers to Discharge: Medical Clearance    Plan: To be determined.    Care Transition Team Assessment    Information Source  Orientation : Oriented x 4  Information Given By: Patient  Informant's Name: [facility]    Readmission Evaluation  Is this a readmission?: No    Elopement Risk  Legal Hold: No  Ambulatory or Self Mobile in Wheelchair: Yes  Disoriented: No  Psychiatric Symptoms: None  History of Wandering: No  Elopement this Admit: No  Vocalizing Wanting to Leave: No  Displays Behaviors, Body Language Wanting to Leave: No-Not at Risk for Elopement  Elopement Risk: Not at Risk for Elopement    Interdisciplinary Discharge Planning  Primary Care Physician: Christina Mccauley  Lives with - Patient's Self Care Capacity: Parents  Patient or legal guardian wants to designate a caregiver (see row info): No  Support Systems: Family Member(s), Friends / Neighbors, Parent  Able to Return to Previous ADL's: Yes  Mobility Issues: Yes  Prior Services: None  Patient Expects to be Discharged to:: Home  Assistance Needed: Unknown at this Time  Durable Medical Equipment: Other - Specify(brace for drop foot on right.)    Discharge Preparedness  What is your plan after discharge?: Home with help  What are your discharge supports?: Parent, Sibling  Prior Functional Level: Ambulatory, Independent with Activities of Daily Living, Independent with Medication Management  Difficulity with ADLs: None    Functional Assesment  Prior Functional Level: Ambulatory, Independent with Activities of Daily Living, Independent with Medication Management    Finances  Financial Barriers to Discharge: No  Prescription  Coverage: Yes    Vision / Hearing Impairment  Vision Impairment : Yes  Right Eye Vision: Impaired, Wears Glasses  Left Eye Vision: Impaired, Wears Glasses  Hearing Impairment : Yes  Hearing Impairment: Right Ear, Patient Declines to Wear Hearing Device(s)  Does Pt Need Special Equipment for the Hearing Impaired?: No         Advance Directive  Advance Directive?: None  Advance Directive offered?: AD Booklet refused    Domestic Abuse  Have you ever been the victim of abuse or violence?: No  Physical Abuse or Sexual Abuse: No  Verbal Abuse or Emotional Abuse: No  Possible Abuse Reported to:: Not Applicable    Psychological Assessment  History of Substance Abuse: None  History of Psychiatric Problems: No  Non-compliant with Treatment: No  Newly Diagnosed Illness: No    Discharge Risks or Barriers  Discharge risks or barriers?: No    Anticipated Discharge Information  Anticipated discharge disposition: Home  Discharge Address: [address] [place] [address]  Discharge Contact Phone Number: [phone]

## 2019-08-21 PROCEDURE — 770020 HCHG ROOM/CARE - TELE (206)

## 2019-08-21 PROCEDURE — 95951 HCHG PF EEG-VIDEO-24HR: CPT | Mod: 26 | Performed by: PSYCHIATRY & NEUROLOGY

## 2019-08-21 PROCEDURE — A9270 NON-COVERED ITEM OR SERVICE: HCPCS | Performed by: PSYCHIATRY & NEUROLOGY

## 2019-08-21 PROCEDURE — 95951 HCHG EEG-VIDEO-24HR: CPT | Performed by: PSYCHIATRY & NEUROLOGY

## 2019-08-21 PROCEDURE — 700102 HCHG RX REV CODE 250 W/ 637 OVERRIDE(OP): Performed by: PSYCHIATRY & NEUROLOGY

## 2019-08-21 PROCEDURE — 99233 SBSQ HOSP IP/OBS HIGH 50: CPT | Mod: 25 | Performed by: PSYCHIATRY & NEUROLOGY

## 2019-08-21 RX ADMIN — MULTIPLE VITAMINS W/ MINERALS TAB 1 TABLET: TAB at 05:17

## 2019-08-21 RX ADMIN — CLOBAZAM 20 MG: 10 TABLET ORAL at 20:03

## 2019-08-21 RX ADMIN — LEVOTHYROXINE SODIUM 50 MCG: 50 TABLET ORAL at 05:17

## 2019-08-21 RX ADMIN — FOLIC ACID 2 MG: 1 TABLET ORAL at 05:17

## 2019-08-21 RX ADMIN — CLOBAZAM 15 MG: 10 TABLET ORAL at 05:14

## 2019-08-21 RX ADMIN — HYDROCORTISONE 5 MG: 5 TABLET ORAL at 10:35

## 2019-08-21 RX ADMIN — HYDROCORTISONE 5 MG: 5 TABLET ORAL at 20:03

## 2019-08-21 NOTE — PROGRESS NOTES
Received report from am RN at bedside, accepted care . Pt is calm and alert, speech is clear, KAUFMAN,  shows no signs of distress. Negative for headache, no N/V, Pt is breathing normally. No SOB, no chest pain. Present bowel sounds and pt passing gas. voiding normally, No needs at this time. POC discussed. Bed in low position call bell within reach, half side rails up, family at bedside. Pt resting quietly. Will continue to assess.

## 2019-08-21 NOTE — PROGRESS NOTES
Spiritual Care Note    Patient Information     Patient's Name: Miles Paredes   MRN: [id number]    YOB: 1995   Age and Gender: 24 y.o. female   Service Area: NEUROSURGERY   Room (and Bed): [place] [address]   Ethnicity or Nationality: WHITE non    Primary Language: English   Worship/Spiritual preference: Episcopal   Place of Residence: [place], [place]   Medical Diagnosis(-es)/Procedure(s): Epilepsy   Code Status: Full Code    Date of Admission: 8/19/2019   Length of Stay: 2 days        Spiritual Care Provider Information:  Name of Spiritual Care Provider: Dianne Delgado  Title of Spiritual Care Provider: Associate   Phone Number: [phone]  E-mail: [email or web address]  Total time : 10 minutes    Spiritual Screen Results:    Gen Nursing  Spiritual Screen  Is your spiritual health or inner well-being important to you as you cope with your medical condition?: No  Would you like to receive a visit from our Spiritual Care team or your own Temple or spiritual leader?: No  Was spiritual care education provided to the patient?: Declined     Palliative Care  PC Worship/Spiritual Screening  Was spiritual care education provided to the patient?: Declined      Encounter/Request Information  Encounter/Request Type   Visited With: Patient not available(Pt sleeping)  Nature of the Visit: Initial, On shift  Continue Visiting: Yes  Next Follow-up Date: 08/22/19  General Visit: Yes  Referral From/ Origin of Request: Verbal staff, Phone    Religous Needs/Values       Spiritual Assessment   Spiritual Care Encounters  Observations/Symptoms: Other (Comment)(Pt sleeping)  Interventions: I checked on patient twice in thirty minutes, and she continued to sleep. I left a 'we missed you' card on her sink as bedside table was full.  Plan: Continue with Family    Notes:

## 2019-08-21 NOTE — PROGRESS NOTES
Received report and assumed pt care.  A&O x4.  Bed alarm and treaded socks on.  Call light and personal belongings within reach.  Bed locked and at lowest position.  KAUFMAN.  VSS.  VEEG monitoring in process.  Tele monitor in use.  Mother at bedside.

## 2019-08-21 NOTE — PROGRESS NOTES
Patient refused SCDs, educated regarding importance.  Education reinforced by AARON Vazquez.  Patient continues to refuse.

## 2019-08-22 PROCEDURE — A9270 NON-COVERED ITEM OR SERVICE: HCPCS | Performed by: PSYCHIATRY & NEUROLOGY

## 2019-08-22 PROCEDURE — 95951 HCHG EEG-VIDEO-24HR: CPT | Performed by: PSYCHIATRY & NEUROLOGY

## 2019-08-22 PROCEDURE — 99233 SBSQ HOSP IP/OBS HIGH 50: CPT | Mod: 25 | Performed by: PSYCHIATRY & NEUROLOGY

## 2019-08-22 PROCEDURE — 770020 HCHG ROOM/CARE - TELE (206)

## 2019-08-22 PROCEDURE — 95951 HCHG PF EEG-VIDEO-24HR: CPT | Mod: 26 | Performed by: PSYCHIATRY & NEUROLOGY

## 2019-08-22 PROCEDURE — 700102 HCHG RX REV CODE 250 W/ 637 OVERRIDE(OP): Performed by: PSYCHIATRY & NEUROLOGY

## 2019-08-22 RX ORDER — LACOSAMIDE 100 MG/1
100 TABLET ORAL 2 TIMES DAILY
Status: DISCONTINUED | OUTPATIENT
Start: 2019-08-22 | End: 2019-08-23 | Stop reason: HOSPADM

## 2019-08-22 RX ADMIN — CLOBAZAM 20 MG: 10 TABLET ORAL at 19:54

## 2019-08-22 RX ADMIN — FOLIC ACID 2 MG: 1 TABLET ORAL at 06:46

## 2019-08-22 RX ADMIN — LEVOTHYROXINE SODIUM 50 MCG: 50 TABLET ORAL at 06:46

## 2019-08-22 RX ADMIN — LACOSAMIDE 100 MG: 100 TABLET, FILM COATED ORAL at 16:41

## 2019-08-22 RX ADMIN — HYDROCORTISONE 5 MG: 5 TABLET ORAL at 19:53

## 2019-08-22 RX ADMIN — HYDROCORTISONE 5 MG: 5 TABLET ORAL at 07:41

## 2019-08-22 RX ADMIN — CLOBAZAM 15 MG: 10 TABLET ORAL at 06:46

## 2019-08-22 RX ADMIN — LACOSAMIDE 100 MG: 100 TABLET, FILM COATED ORAL at 07:41

## 2019-08-22 RX ADMIN — MULTIPLE VITAMINS W/ MINERALS TAB 1 TABLET: TAB at 06:46

## 2019-08-22 NOTE — PROGRESS NOTES
Chief complaint: Seizures    Interim history:  [facility] remains in the [facility]U.  Her mother is at bedside.  The patient was sleep deprived last night.  We continue to hold Dilantin.  She continues on clobazam unchanged.  She has not had any spells.  Her mood is good.  She denies any complaints at this point.  She has been going to the bathroom without any issues.  She is eating her meals and drinking fluids without any problems.    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   • Unspecified hemorrhagic conditions     bone marrow transplant       Past surgical history:   Past Surgical History:   Procedure Laterality Date   • PB BONE MARROW/STEM XPLANT,ALLOGENIC  12/22/09   • OTHER NEUROLOGICAL SURG      tumor resection x2, 6/28/09 & 11/20/09   • [place] PROTON BEAM DEL,SIMPLE      completed 30 rounds radiation        Family history:   Family History   Family history unknown: Yes       Social history:   Social History     Socioeconomic History   • Marital status: Single     Spouse name: Not on file   • Number of children: Not on file   • Years of education: Not on file   • Highest education level: Not on file   Occupational History   • Not on file   Social Needs   • Financial resource strain: Not on file   • Food insecurity:     Worry: Not on file     Inability: Not on file   • Transportation needs:     Medical: Not on file     Non-medical: Not on file   Tobacco Use   • Smoking status: Never Smoker   • Smokeless tobacco: Never Used   Substance and Sexual Activity   • Alcohol use: No   • Drug use: No   • Sexual  activity: Not on file   Lifestyle   • Physical activity:     Days per week: Not on file     Minutes per session: Not on file   • Stress: Not on file   Relationships   • Social connections:     Talks on phone: Not on file     Gets together: Not on file     Attends Buddhist service: Not on file     Active member of club or organization: Not on file     Attends meetings of clubs or organizations: Not on file     Relationship status: Not on file   • Intimate partner violence:     Fear of current or ex partner: Not on file     Emotionally abused: Not on file     Physically abused: Not on file     Forced sexual activity: Not on file   Other Topics Concern   • Not on file   Social History Narrative    Adopted from [place] @ 13 months    Graduated [employer] 6/2014, back at school [employer] for vocational training    Also volunteers at [facility] lives with mom and 2 younger adopted sisters (not bio)       Current medications:   Current Facility-Administered Medications   Medication Dose   • therapeutic multivitamin-minerals (THERAGRAN-M) tablet 1 Tab  1 Tab   • ibuprofen (MOTRIN) tablet 400 mg  400 mg   • LORazepam (ATIVAN) injection 1 mg  1 mg    Or   • LORazepam (ATIVAN) injection 2 mg  2 mg   • folic acid (FOLVITE) tablet 2 mg  2 mg   • hydrocortisone (CORTEF) tablet 5 mg  5 mg   • levothyroxine (SYNTHROID) tablet 50 mcg  50 mcg   • medroxyPROGESTERone (PROVERA) tablet 5 mg  5 mg   • clobazam (ONFI) tablet 15 mg  15 mg   • clobazam (ONFI) tablet 20 mg  20 mg       Medication Allergy:  Allergies   Allergen Reactions   • Oxcarbazepine Rash   • Lamotrigine          Review of systems:   Constitutional: denies fever, night sweats, weight loss, or malaise/fatigue.   Eyes: denies acute vision change, eye pain or secretion.   Ears, Nose, Mouth, Throat: denies nasal secretion, nasal bleeding, difficulty swallowing, hearing loss, tinnitus, vertigo, ear pain, oral ulcers or lesions.   Endocrine: denies recent weight changes, heat or cold  intolerance, polyuria, polydypsia, polyphagia,abnormal hair growth.  Cardiovascular: denies new onset of chest pain, palpitations, syncope, lower extremity edema, or dyspnea of exertion.  Pulmonary: denies shortness of breath, new onset of cough, hemoptysis, wheezing, chest pain or flu-like symptoms.   GI: denies nausea, vomiting, diarrhea, GI bleeding, change in appetite, abdominal pain, and change in bowel habits.  : denies urinary incontinence, retention or hematuria.  Heme/oncology: denies history of easy bruising or bleeding.   Allergy/immunology: denies hives/urticarial.  Dermatologic: denies new rash.  Musculoskeletal:denies joint swelling or pain, muscle pain, neck and back pain. Neurologic: denies headaches, acute visual changes, facial droopiness, anesthesia, ataxia, change in speech or language, memory loss, abnormal movements, seizures, loss of consciousness, or episodes of confusion.   Psychiatric: denies symptoms of depression, anxiety, hallucinations, mood swings or changes, suicidal or homicidal thoughts.     Physical examination:   Vitals:    08/21/19 0400 08/21/19 0700 08/21/19 1201 08/21/19 1425   BP: (!) 96/59 (!) 97/58 112/69 108/61   Pulse: 62 (!) 51 (!) 56 74   Resp: 16 17 17 16   Temp: 36.1 °C (96.9 °F) 36.3 °C (97.4 °F) 36.2 °C (97.1 °F) 36.7 °C (98.1 °F)   TempSrc: Temporal Temporal Temporal Temporal   SpO2: 99% 97% 97% 97%   Weight:       Height:         General: Patient in no acute distress, pleasant and cooperative.  HEENT: Normocephalic, no signs of acute trauma.   Neck: supple, no meningeal signs or carotid bruits. There is normal range of motion. No tenderness on exam.   Chest: clear to auscultation. No cough.   CV: RRR, no murmurs.   Skin: no signs of acute rashes or trauma.   Musculoskeletal: joints exhibit full range of motion, without any pain to palpation. There are no signs of joint or muscle swelling. There is no tenderness to deep palpation of muscles.   Psychiatric: No  hallucinatory behavior. Denies symptoms of depression or suicidal ideation. Mood and affect appear normal on exam.     NEUROLOGICAL EXAM:   Mental status, orientation: Awake, alert and fully oriented.   Speech and language: speech is clear and fluent. The patient is able to name, repeat and comprehend.   Memory: There is intact recollection of recent and remote events.   Cranial nerve exam: Pupils are 3-4 mm bilaterally and equally reactive to light and accommodation. Visual fields are intact by confrontation. There is no nystagmus on primary or secondary gaze. Intact full EOM in all directions of gaze. Face appears symmetric. Sensation in the face is intact to light touch. Uvula is midline. Palate elevates symmetrically. Tongue is midline and without any signs of tongue biting or fasciculations.Sternocleidomastoid muscles exhibit is normal strength bilaterally. Shoulder shrug is intact bilaterally.   Motor exam: Strength is 5/5 in all extremities, with mild weakness in the right lower extremity. Tone is normal. No abnormal movements were seen on exam.   Sensory exam reveals hypoesthesia on the right side and extremities.    Deep tendon reflexes:  2+ throughout. Plantar responses are flexor. There is no clonus.   Coordination: shows a normal finger-nose-finger. Normal rapidly alternating movements.   Gait: Deferred.      ANCILLARY DATA REVIEWED:       Lab Data Review:  No results found for this or any previous visit (from the past 24 hour(s)).      Records reviewed:   Chart reviewed.     Imaging:   MRI brain with and without, 12/11/2018:  1.  There is no recurrent lesion.  2.  Stable postsurgical changes.  3.  Multifocal areas of stable encephalomalacia with chronic hemosiderin deposition in the left temporal, parietal and occipital lobes and left thalamus.  4.  Stable Nonspecific T2 hyperintensities in the frontal white matter likely representing nonspecific foci of gliosis.  (I personally reviewed  images).     EEG:  Video EEG, 4/14/2010:  CLINICAL INTERPRETATION:  This is abnormal video EEG monitoring for  patient of this age.  The patient has continuous ictal discharges in  the left hemisphere consisting of spike and wave and polyspike and  wave discharges with a frequency of 1 Hz.  There was no response to  propofol and phenobarbital during this recording.  Clinical  correlation is required.           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  History of left temporal PNET, on remission, followed by yearly MRIs.  Last MRI showed no recurrent lesions (12/11/2018).  Status post resection, chemo and radiation.  Area of encephalomalacia likely dysfunctional and etiology for her epileptic spells.  Has not had a seizure in about 2 years.  The patient was on Dilantin 300 mg nightly at home (most recent level was supratherapeutic).  The patient is also on clobazam 15 mg in the morning and 20 mg at bedtime.  The patient and mother remain interested in coming off Dilantin, they are afraid of long-term side effects.  The patient will be sleep deprived tonight.  We will continue to hold Dilantin, in attempt to capture the typical spell.  Her EEG is abnormal. Both patient and mother understand the risk for seizures, including their consequences.  They have decided to proceed with the plan as outlined.     The patient indicates that she is not sexually active.  We discussed recommendation for dual contraception and folic acid.     The patient does not drive.     Nature of admission was discussed, including fall precautions.  Patient is aware she must ambulate with assistance all the time.  Patient will be on continuous pulse oximetry and continuous telemetry.     Seizure precautions.     Her right sided hypoesthesia and right homonymous hemianopia are chronic and postsurgical.       FOLLOW-UP:   In my office, after discharge.       EDUCATION AND COUNSELING:  -Education was provided to the  patient and/or family regarding diagnosis and prognosis. The chronic and unpredictable nature of the condition were discussed. There is increased risk for additional events, which may carry potential for significant injuries and death. Discussed frequent seizure triggers: sleep deprivation, medication non-compliance, use of illegal drugs/alcohol, stress, and others.   -We reviewed in detail the current antiepileptic regimen. Potential side effects of antiepileptics were discussed at length, including but no limited to: hypersensitivity reactions (rash and others, some of which can be fatal), visual field changes (some of which may be irreversible), glaucoma, diplopia, kidney stones, osteopenia/osteoporosis/bone fractures, hyperthermia/anhydrosis, hyponatremia, tremors/abnormal movements, ataxia, dizziness, fatigue, increased risk for falls, risk for cardiac arrhythmias/syncope, gastrointestinal side effects(hepatitis, pancreatitis, gastritis, ulcers), gingival hypertrophy/bleeding, drowsiness, sedation, anxiety/nervousness, increased risk for suicide, increased risk for depression, and psychosis.   -We also reviewed drug-drug interactions and their potential effect on seizure control and medication side effects.    -We also discussed in detail potential effects of seizures, epilepsy, and medications during pregnancy, including but not limited to fetal malformations, child developmental/intellectual disability, fetal/ risk for hemorrhages, stillbirth, maternal death, premature birth, and others. The patient/family aware that pregnancy should be avoided, unless desired, in which case we recommend discussing with us at least a year prior to planned conception. To avoid undesired pregnancy while on antiepileptics, we recommend dual contraception.   -Folic acid 2 mg is recommended for all females in childbearing age (12-44 years of age).   -Recommend chronic vitamin D supplementation and regular exercise (if not  contraindicated).   -Patient/family educated on risk for SUDEP (Sudden Death in Epilepsy). Counseling was provided on the importance of strict medication and follow up compliance. The patient/family understand the risks associated with non-adherence with the medical plan as outlined, including but not limited to an increased risk for breakthrough seizures, which may contribute to injuries, disability, status epilepticus, and even death.   -Counseling was also provided on potential effects of alcohol and other drugs, which may lower seizure threshold and/or affect the metabolism of antiepileptic drugs. We recommend avoidance of alcohol and illegal drugs.  -Avoid sleep deprivation.   -We extensively discussed the aspects related to safety in drivers who suffer from epilepsy. The patient is encourage to report to the Division of Motor Vehicles of any condition and/or spells related to confusion, disorientation, and/or loss of awareness and/or loss of consciousness; as these may pose a safety issue if they occur while operating a motor vehicle. The patient and/or family are ultimately responsible for exercising caution and abiding to regulations in place.   -Other seizure precautions were discussed at length, including no diving, no skydiving, no climbing or exposure to unprotected heights, no unsupervised swimming, no Jacuzzi or bathing in bathtubs or deep bodies of water. The patient/family have been advised about risks for operating any machinery while suffering from seizures / syncope / epilepsy and/or while taking antiepileptic drugs.   -The patient understands and agrees that due to the complexity of his/her diagnosis, results of any testing and further recommendations will typically be discussed/made during a face to face encounter in my office. The patient and/or family further understands it is their responsibility to keep proper follow up.     Patient/family agree with plan, as outlined.         Alberto Herbert,  MD   Epilepsy and Neurodiagnostics.   Clinical  of [facility].   Diplomate in Neurology, Epilepsy, and Electrodiagnostic Medicine.   Office: [phone]  Fax: [phone]    BILLING DOCUMENTATION:     I have performed physical exam and reviewed and updated ROS and plan today 8/21/2019. In review of that note, there are no new changes except as documented above.     Counseling:  I spent greater than 50% time face-to-face time of a total of 35 minutes visit. Over 50% of the time of the visit today was spent on counseling and or coordination of care wtih the patient and/or family, with greater than 50% of the total discussing my assessment and plan as stated above.

## 2019-08-22 NOTE — PROGRESS NOTES
Received report from day shift RN. Assumed care of patient. Pt shows no s/sx of distress. Discussed plan of care for day with patient and received verbal understanding. Pt is going to be sleep deprived tonight. Call light within reach, bed in low position.

## 2019-08-22 NOTE — PROGRESS NOTES
Chief complaint: Seizures.    Interim history:  Tribunejasmyn Paredes remains in the epilepsy monitoring unit.  The patient had a spell yesterday afternoon, where she complained of heaviness of the right arm, some mild difficulties with language, fell off.  This was a focal seizure.  At this morning, she was started on Vimpat 100 mg twice daily, tolerating well.  She continues on Onfi as well.  So far no side effects.  The patient does have a history to rashes with sodium channel blockers (oxcarbazepine, Lamictal).    The mother was at bedside.    The patient had been sleep deprived last night.      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   • Unspecified hemorrhagic conditions     bone marrow transplant       Past surgical history:   Past Surgical History:   Procedure Laterality Date   • PB BONE MARROW/STEM XPLANT,ALLOGENIC  12/22/09   • OTHER NEUROLOGICAL SURG      tumor resection x2, 6/28/09 & 11/20/09   • NC PROTON BEAM DEL,SIMPLE      completed 30 rounds radiation        Family history:   Family History   Family history unknown: Yes       Social history:   Social History     Socioeconomic History   • Marital status: Single     Spouse name: Not on file   • Number of children: Not on file   • Years of education: Not on file   • Highest education level: Not on file   Occupational History   • Not on file   Social Needs   • Financial resource strain: Not on file   • Food insecurity:     Worry: Not on file     Inability: Not on file   • Transportation needs:     Medical: Not on file     Non-medical: Not on file    Tobacco Use   • Smoking status: Never Smoker   • Smokeless tobacco: Never Used   Substance and Sexual Activity   • Alcohol use: No   • Drug use: No   • Sexual activity: Not on file   Lifestyle   • Physical activity:     Days per week: Not on file     Minutes per session: Not on file   • Stress: Not on file   Relationships   • Social connections:     Talks on phone: Not on file     Gets together: Not on file     Attends Yazidi service: Not on file     Active member of club or organization: Not on file     Attends meetings of clubs or organizations: Not on file     Relationship status: Not on file   • Intimate partner violence:     Fear of current or ex partner: Not on file     Emotionally abused: Not on file     Physically abused: Not on file     Forced sexual activity: Not on file   Other Topics Concern   • Not on file   Social History Narrative    Adopted from [place] @ 13 months    Graduated [employer] 6/2014, back at school [employer] for vocational training    Also volunteers at [employer] lives with mom and 2 younger adopted sisters (not bio)       Current medications:   Current Facility-Administered Medications   Medication Dose   • lacosamide (VIMPAT) tablet 100 mg  100 mg   • therapeutic multivitamin-minerals (THERAGRAN-M) tablet 1 Tab  1 Tab   • ibuprofen (MOTRIN) tablet 400 mg  400 mg   • LORazepam (ATIVAN) injection 1 mg  1 mg    Or   • LORazepam (ATIVAN) injection 2 mg  2 mg   • folic acid (FOLVITE) tablet 2 mg  2 mg   • hydrocortisone (CORTEF) tablet 5 mg  5 mg   • levothyroxine (SYNTHROID) tablet 50 mcg  50 mcg   • medroxyPROGESTERone (PROVERA) tablet 5 mg  5 mg   • clobazam (ONFI) tablet 15 mg  15 mg   • clobazam (ONFI) tablet 20 mg  20 mg       Medication Allergy:  Allergies   Allergen Reactions   • Oxcarbazepine Rash   • Lamotrigine          Review of systems:   Constitutional: denies fever, night sweats, weight loss, or malaise/fatigue.   Eyes: denies acute vision change, eye pain or secretion.   Ears, Nose,  Mouth, Throat: denies nasal secretion, nasal bleeding, difficulty swallowing, hearing loss, tinnitus, vertigo, ear pain, oral ulcers or lesions.   Endocrine: denies recent weight changes, heat or cold intolerance, polyuria, polydypsia, polyphagia,abnormal hair growth.  Cardiovascular: denies new onset of chest pain, palpitations, syncope, lower extremity edema, or dyspnea of exertion.  Pulmonary: denies shortness of breath, new onset of cough, hemoptysis, wheezing, chest pain or flu-like symptoms.   GI: denies nausea, vomiting, diarrhea, GI bleeding, change in appetite, abdominal pain, and change in bowel habits.  : denies urinary incontinence, retention or hematuria.  Heme/oncology: denies history of easy bruising or bleeding.   Allergy/immunology: denies hives/urticarial.  Dermatologic: denies new rash.  Musculoskeletal:denies joint swelling or pain, muscle pain, neck and back pain. Neurologic: denies headaches, acute visual changes, facial droopiness, anesthesia, ataxia, memory loss, abnormal movements, loss of consciousness.  Psychiatric: denies symptoms of depression, anxiety, hallucinations, mood swings or changes, suicidal or homicidal thoughts.     Physical examination:   Vitals:    08/22/19 0000 08/22/19 0400 08/22/19 0734 08/22/19 1135   BP: (!) 96/49 (!) 94/53 (!) 99/55 (!) 99/54   Pulse: 66 67 62 66   Resp: 16 16 17 17   Temp: 36.4 °C (97.5 °F) 36.2 °C (97.1 °F) 36.2 °C (97.1 °F) 36.2 °C (97.1 °F)   TempSrc: Temporal Temporal Temporal Temporal   SpO2: 94% 98% 98% 96%   Weight:       Height:         General: Patient in no acute distress, pleasant and cooperative.  HEENT: Normocephalic, no signs of acute trauma.   Neck: supple, no meningeal signs or carotid bruits. There is normal range of motion. No tenderness on exam.   Chest: clear to auscultation. No cough.   CV: RRR, no murmurs.   Skin: no signs of acute rashes or trauma.   Musculoskeletal: joints exhibit full range of motion, without any pain to  palpation. There are no signs of joint or muscle swelling. There is no tenderness to deep palpation of muscles.   Psychiatric: No hallucinatory behavior. Denies symptoms of depression or suicidal ideation. Mood and affect appear normal on exam.     NEUROLOGICAL EXAM:   Mental status, orientation: Awake, alert and fully oriented.   Speech and language: speech is clear and fluent. The patient is able to name, repeat and comprehend.   Memory: There is intact recollection of recent and remote events.   Cranial nerve exam: Pupils are 3-4 mm bilaterally and equally reactive to light and accommodation. Visual fields are intact by confrontation. There is no nystagmus on primary or secondary gaze. Intact full EOM in all directions of gaze. Face appears symmetric. Sensation in the face is intact to light touch. Uvula is midline. Palate elevates symmetrically. Tongue is midline and without any signs of tongue biting or fasciculations.Sternocleidomastoid muscles exhibit is normal strength bilaterally. Shoulder shrug is intact bilaterally.   Motor exam: Strength is 5/5 in all extremities. Tone is normal. No abnormal movements were seen on exam.   Sensory exam hypoesthesia on the right-sided extremities.  Deep tendon reflexes:  2+ throughout. Plantar responses are flexor. There is no clonus.   Coordination: shows a normal finger-nose-finger. Normal rapidly alternating movements.   Gait: Deferred.      ANCILLARY DATA REVIEWED:       Lab Data Review:  No results found for this or any previous visit (from the past 24 hour(s)).      RRecords reviewed:   Chart reviewed.     Imaging:   MRI brain with and without, 12/11/2018:  1.  There is no recurrent lesion.  2.  Stable postsurgical changes.  3.  Multifocal areas of stable encephalomalacia with chronic hemosiderin deposition in the left temporal, parietal and occipital lobes and left thalamus.  4.  Stable Nonspecific T2 hyperintensities in the frontal white matter likely representing  nonspecific foci of gliosis.  (I personally reviewed images).     EEG:  Video EEG, 4/14/2010:  CLINICAL INTERPRETATION:  This is abnormal video EEG monitoring for  patient of this age.  The patient has continuous ictal discharges in  the left hemisphere consisting of spike and wave and polyspike and  wave discharges with a frequency of 1 Hz.  There was no response to  propofol and phenobarbital during this recording.  Clinical  correlation is required.           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  History of left temporal PNET, on remission, followed by yearly MRIs.  Last MRI showed no recurrent lesions (12/11/2018).  Status post resection, chemo and radiation.  Large area of encephalomalacia likely dysfunctional and suspected epileptogenic to account for her seizures.  Had not had a seizure in about 2 years, but with sleep deprivation and holding phenytoin had a focal left hemisphere seizure yesterday (right arm heaviness, mild difficulties with words, mild confusion).  This means she will require more than one antiepileptic for seizure control.  The patient was on Dilantin 300 mg nightly at home (most recent level was supratherapeutic).  The patient is also on clobazam 15 mg in the morning and 20 mg at bedtime.  The patient and mother wanted to come off Dilantin, they are afraid of long-term side effects.  Dilantin has been discontinued.  The patient has been started on Vimpat 100 mg twice a day, and denies so far any side effects.  The patient and mother understand that there may be some cross-reactivity with seizure medications and history of previous rashes.  In my experience, which I shared with them, I have not seen a patient who has developed a rash with Vimpat.  The patient and mother were willing to try.    The patient indicates that she is not sexually active.  We discussed recommendation for dual contraception and folic acid.     The patient does not drive, and will  remain as such for now.     Nature of admission was discussed, including fall precautions.  Patient is aware she must ambulate with assistance all the time.  Patient will be on continuous pulse oximetry and continuous telemetry.     Seizure precautions.     Her right sided hypoesthesia and right homonymous hemianopia are chronic and postsurgical.    Anticipate discharge home tomorrow.      FOLLOW-UP:   Follow-up in my office after discharge.        EDUCATION AND COUNSELING:  -Education was provided to the patient and/or family regarding diagnosis and prognosis. The chronic and unpredictable nature of the condition were discussed. There is increased risk for additional events, which may carry potential for significant injuries and death. Discussed frequent seizure triggers: sleep deprivation, medication non-compliance, use of illegal drugs/alcohol, stress, and others.   -We reviewed in detail the current antiepileptic regimen. Potential side effects of antiepileptics were discussed at length, including but no limited to: hypersensitivity reactions (rash and others, some of which can be fatal), visual field changes (some of which may be irreversible), glaucoma, diplopia, kidney stones, osteopenia/osteoporosis/bone fractures, hyperthermia/anhydrosis, hyponatremia, tremors/abnormal movements, ataxia, dizziness, fatigue, increased risk for falls, risk for cardiac arrhythmias/syncope, gastrointestinal side effects(hepatitis, pancreatitis, gastritis, ulcers), gingival hypertrophy/bleeding, drowsiness, sedation, anxiety/nervousness, increased risk for suicide, increased risk for depression, and psychosis.   -We also reviewed drug-drug interactions and their potential effect on seizure control and medication side effects.    -We also discussed in detail potential effects of seizures, epilepsy, and medications during pregnancy, including but not limited to fetal malformations, child developmental/intellectual disability,  fetal/ risk for hemorrhages, stillbirth, maternal death, premature birth, and others. The patient/family aware that pregnancy should be avoided, unless desired, in which case we recommend discussing with us at least a year prior to planned conception. To avoid undesired pregnancy while on antiepileptics, we recommend dual contraception.   -Folic acid 2 mg is recommended for all females in childbearing age (12-44 years of age).   -Recommend chronic vitamin D supplementation and regular exercise (if not contraindicated).   -Patient/family educated on risk for SUDEP (Sudden Death in Epilepsy). Counseling was provided on the importance of strict medication and follow up compliance. The patient/family understand the risks associated with non-adherence with the medical plan as outlined, including but not limited to an increased risk for breakthrough seizures, which may contribute to injuries, disability, status epilepticus, and even death.   -Counseling was also provided on potential effects of alcohol and other drugs, which may lower seizure threshold and/or affect the metabolism of antiepileptic drugs. We recommend avoidance of alcohol and illegal drugs.  -Avoid sleep deprivation.   -We extensively discussed the aspects related to safety in drivers who suffer from epilepsy. The patient is encourage to report to the Division of Motor Vehicles of any condition and/or spells related to confusion, disorientation, and/or loss of awareness and/or loss of consciousness; as these may pose a safety issue if they occur while operating a motor vehicle. The patient and/or family are ultimately responsible for exercising caution and abiding to regulations in place.   -Other seizure precautions were discussed at length, including no diving, no skydiving, no climbing or exposure to unprotected heights, no unsupervised swimming, no Jacuzzi or bathing in bathtubs or deep bodies of water. The patient/family have been advised about  risks for operating any machinery while suffering from seizures / syncope / epilepsy and/or while taking antiepileptic drugs.   -The patient understands and agrees that due to the complexity of his/her diagnosis, results of any testing and further recommendations will typically be discussed/made during a face to face encounter in my office. The patient and/or family further understands it is their responsibility to keep proper follow up.     Patient/family agree with plan, as outlined.         Alberto Herbert MD   Epilepsy and Neurodiagnostics.   Clinical  of Neurology [facility].   Diplomate in Neurology, Epilepsy, and Electrodiagnostic Medicine.   Office: [phone]  Fax: [phone]      BILLING DOCUMENTATION:     I have performed physical exam and reviewed and updated ROS and plan today 8/22/2019. In review of that note, there are no new changes except as documented above.     Counseling:  I spent greater than 50% time face-to-face time of a total of 36 minutes visit. Over 50% of the time of the visit today was spent on counseling and or coordination of care wtih the patient and/or family, with greater than 50% of the total discussing my assessment and plan as stated above.

## 2019-08-23 VITALS
DIASTOLIC BLOOD PRESSURE: 77 MMHG | SYSTOLIC BLOOD PRESSURE: 120 MMHG | TEMPERATURE: 98.3 F | OXYGEN SATURATION: 96 % | BODY MASS INDEX: 21.71 KG/M2 | WEIGHT: 115 LBS | HEART RATE: 68 BPM | RESPIRATION RATE: 16 BRPM | HEIGHT: 61 IN

## 2019-08-23 PROBLEM — G40.219 PHARMACORESISTANT PARTIAL EPILEPSY WITH IMPAIRMENT OF CONSCIOUSNESS, INTRACTABLE (HCC): Chronic | Status: ACTIVE | Noted: 2019-08-23

## 2019-08-23 PROCEDURE — 99239 HOSP IP/OBS DSCHRG MGMT >30: CPT | Mod: 25 | Performed by: PSYCHIATRY & NEUROLOGY

## 2019-08-23 PROCEDURE — 95951 EEG: CPT | Mod: 26,52 | Performed by: PSYCHIATRY & NEUROLOGY

## 2019-08-23 PROCEDURE — 4A10X4Z MONITORING OF CENTRAL NERVOUS ELECTRICAL ACTIVITY, EXTERNAL APPROACH: ICD-10-PCS | Performed by: PSYCHIATRY & NEUROLOGY

## 2019-08-23 PROCEDURE — 95951 EEG: CPT | Mod: 52 | Performed by: PSYCHIATRY & NEUROLOGY

## 2019-08-23 PROCEDURE — 700102 HCHG RX REV CODE 250 W/ 637 OVERRIDE(OP): Performed by: PSYCHIATRY & NEUROLOGY

## 2019-08-23 PROCEDURE — A9270 NON-COVERED ITEM OR SERVICE: HCPCS | Performed by: PSYCHIATRY & NEUROLOGY

## 2019-08-23 RX ORDER — LACOSAMIDE 100 MG/1
100 TABLET ORAL 2 TIMES DAILY
Qty: 60 TAB | Refills: 5 | Status: SHIPPED | OUTPATIENT
Start: 2019-08-23 | End: 2019-08-23

## 2019-08-23 RX ORDER — LORAZEPAM 0.5 MG/1
0.5 TABLET ORAL EVERY 8 HOURS PRN
Qty: 30 TAB | Refills: 1 | Status: SHIPPED | OUTPATIENT
Start: 2019-08-23 | End: 2019-10-22

## 2019-08-23 RX ORDER — M-VIT,TX,IRON,MINS/CALC/FOLIC 27MG-0.4MG
1 TABLET ORAL DAILY
Qty: 30 TAB | Refills: 11 | Status: SHIPPED | OUTPATIENT
Start: 2019-08-24

## 2019-08-23 RX ADMIN — FOLIC ACID 2 MG: 1 TABLET ORAL at 07:33

## 2019-08-23 RX ADMIN — LEVOTHYROXINE SODIUM 50 MCG: 50 TABLET ORAL at 07:33

## 2019-08-23 RX ADMIN — CLOBAZAM 15 MG: 10 TABLET ORAL at 07:33

## 2019-08-23 RX ADMIN — MULTIPLE VITAMINS W/ MINERALS TAB 1 TABLET: TAB at 07:33

## 2019-08-23 RX ADMIN — LACOSAMIDE 100 MG: 100 TABLET, FILM COATED ORAL at 07:33

## 2019-08-23 NOTE — DISCHARGE INSTRUCTIONS
Discharge Instructions    Discharged to home by car with relative. Discharged via wheelchair, hospital escort: Refused.  Special equipment needed: Not Applicable    Be sure to schedule a follow-up appointment with your primary care doctor or any specialists as instructed.     Discharge Plan:   Diet Plan: Discussed  Activity Level: Discussed  Confirmed Follow up Appointment: Patient to Call and Schedule Appointment  Confirmed Symptoms Management: Discussed  Medication Reconciliation Updated: Yes  Influenza Vaccine Indication: Indicated: Not available from distributor/    I understand that a diet low in cholesterol, fat, and sodium is recommended for good health. Unless I have been given specific instructions below for another diet, I accept this instruction as my diet prescription.   Other diet: Regular    Special Instructions: None    · Is patient discharged on Warfarin / Coumadin?   No     Depression / Suicide Risk    As you are discharged from this [facility] facility, it is important to learn how to keep safe from harming yourself.    Recognize the warning signs:  · Abrupt changes in personality, positive or negative- including increase in energy   · Giving away possessions  · Change in eating patterns- significant weight changes-  positive or negative  · Change in sleeping patterns- unable to sleep or sleeping all the time   · Unwillingness or inability to communicate  · Depression  · Unusual sadness, discouragement and loneliness  · Talk of wanting to die  · Neglect of personal appearance   · Rebelliousness- reckless behavior  · Withdrawal from people/activities they love  · Confusion- inability to concentrate     If you or a loved one observes any of these behaviors or has concerns about self-harm, here's what you can do:  · Talk about it- your feelings and reasons for harming yourself  · Remove any means that you might use to hurt yourself (examples: pills, rope, extension cords,  firearm)  · Get professional help from the community (Mental Health, Substance Abuse, psychological counseling)  · Do not be alone:Call your Safe Contact- someone whom you trust who will be there for you.  · Call your local CRISIS HOTLINE 771-7738 or 441-323-3277  · Call your local Children's Mobile Crisis Response Team [place] (700) 931-3034 or www.[employer]  · Call the toll free National Suicide Prevention Hotlines   · National Suicide Prevention Lifeline 255-080-SWBD (4743)  · National Hope Line Network 800-SUICIDE (411-0565)

## 2019-08-23 NOTE — PROGRESS NOTES
Patient refused SCDs and bed alarm, educated regarding importance.  Education reinforced by AARON Vazquez.  Patient continues to refuse.

## 2019-08-23 NOTE — CARE PLAN
Problem: Knowledge Deficit  Goal: Knowledge of disease process/condition, treatment plan, diagnostic tests, and medications will improve  Outcome: PROGRESSING AS EXPECTED  Goal: Knowledge of the prescribed therapeutic regimen will improve  Intervention: Discuss information regarding therpeutic regimen and document in education  Note:   Pt educated on meds and POC.       Problem: Discharge Barriers/Planning  Goal: Patient's continuum of care needs will be met  Intervention: Collaborate with Transitional Care Team and Interdisciplinary Team to meet discharge needs  Note:   Pt to d/c home in the am post study

## 2019-08-23 NOTE — PROGRESS NOTES
Received report from day shift RN. Assumed care of patient. Pt shows no s/sx of distress. Discussed plan of care for day with patient and received verbal understanding. Call light within reach,  bed in low position.

## 2019-08-23 NOTE — DISCHARGE SUMMARY
Discharge Summary    CHIEF COMPLAINT ON ADMISSION  Seizures, history of brain tumor.    Reason for Admission  Epilepsy     Admission Date  8/19/2019    CODE STATUS  Full Code    HPI & HOSPITAL COURSE  This is a 24 y.o. female who was electively admitted to the epilepsy monitoring unit, as referred by Dr. Gordon, for evaluation of subclinical seizures, and to address issues related to Dilantin and whether patient needed to antiepileptics for seizure control.  The patient had had a issues with Dilantin, including intermittently a toxic level.  The patient and her mother had concerns about continuation of Dilantin chronically and well-known long-term side effects of it.  In addition to Dilantin at home, the patient was also taking clobazam 50 mg in the morning and 20 mg at bedtime.  Since admission, Dilantin was lowered to only 100 mg p.o. nightly on day 1, with no seizures, the following day this was discontinued while she remained on clobazam unchanged.  The patient was sleep deprived for several nights in a row.  She ended up having a typical focal seizure, arising from the left posterior quadrant, and clinically related to patient's sensation of heaviness on the right arm, mild difficulties with words, and feeling mildly off.  The seizure lasted about 3 minutes and was not associated with any motor component.  A discussion was held with the patient and the mother, my recommendation is that she needed a second antiepileptic.  Options were discussed, initially they agreed to the addition of Vimpat 100 mg twice a day, which she tolerated well for the last 24 hours prior to discharge, but at the time of discharge, the mother reached out to the patient's pharmacist who indicated that the patient had been on Vimpat at that dose back in 2012, and was discontinued for unknown reason.  Neither the patient on the mother remembered that he was a rash, but thought that maybe it was since she had rashes with prior  medications.  Ultimately the decision was made to switch to another antiepileptic.  They were concerned about the use of holders antiepileptics with common interactions and side effects, and ultimately decided to try Briviact, and was given samples of 100 mg, and a prescription to take 100 mg p.o. twice daily.  The patient was also given a prescription for breakthrough use of Ativan, in case of seizure occurs.  Both patient and mother are aware that when adjusting seizure medications, seizures may be common.  The patient had no complications during the stay.    New antiepileptic regimen:  -Clobazam 15 mg p.o. in the morning and 20 mg p.o. Nightly  -Briviact 100 mg p.o. twice daily  -Ativan 0.5 mg as needed only for seizures.           Therefore, she is discharged in good and stable condition to home with close outpatient follow-up.      Discharge Date  8/23/2019    FOLLOW UP ITEMS POST DISCHARGE  Follow-up in my office after discharge.    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 *      FOLLOW UP  Future Appointments   Date Time Provider Department Center   11/26/2019  9:00 AM Alberto Herbert M.D. [facility] None   12/9/2019  9:00 AM Tio Valero M.D. [facility] None   12/12/2019  3:00 PM Ai Moore M.D. [facility] [address]     No follow-up provider specified.    MEDICATIONS ON DISCHARGE     Medication List      START taking these medications      Instructions   brivaracetam 100 MG Tabs tablet  Commonly known as:  BRIVIACT   Take 1 Tab by mouth 2 Times a Day for 180 days.  Dose:  100 mg     therapeutic multivitamin-minerals Tabs  Start taking on:  8/24/2019   Take 1 Tab by mouth every day.  Dose:  1 Tab        CHANGE how you take these medications      Instructions   clobazam 5 MG Tabs tablet  What changed:  See the new instructions.  Commonly known as:  ONFI   Take 3  Tabs by mouth every day AND 4 Tabs every bedtime. Do all this for 180 days. 15 mg QAM  20 mg QPM     LORazepam 0.5 MG Tabs  What changed:    · medication strength  · how much to take  · when to take this  Commonly known as:  ATIVAN   Take 1 Tab by mouth every 8 hours as needed (seizures) for up to 60 days.  Dose:  0.5 mg        CONTINUE taking these medications      Instructions   folic acid 1 MG Tabs  Commonly known as:  FOLVITE   Take 2 mg by mouth every day.  Dose:  2 mg     hydrocortisone 5 MG Tabs  Commonly known as:  CORTEF   Take 1 Tab by mouth 2 Times a Day. Take one tablet by mouth twice daily and triple dose for illness or other stress  Dose:  5 mg     levothyroxine 50 MCG Tabs  Commonly known as:  SYNTHROID   Take 1 Tab by mouth Every morning on an empty stomach.  Dose:  50 mcg     norethindrone-ethinyl estradiol 1-20 MG-MCG per tablet  Commonly known as:  JUNEL FE 1/20   Take 1 Tab by mouth every day.  Dose:  1 Tab     PROVERA 10 MG Tabs  Generic drug:  medroxyPROGESTERone   Take 5 mg by mouth See Admin Instructions. Days 18-25 each month  Dose:  5 mg        STOP taking these medications    DILANTIN 100 MG Caps  Generic drug:  phenytoin ER            Allergies  Allergies   Allergen Reactions   • Oxcarbazepine Rash   • Lamotrigine        DIET  Orders Placed This Encounter   Procedures   • Diet Order Regular     Standing Status:   Standing     Number of Occurrences:   1     Order Specific Question:   Diet:     Answer:   Regular [1]       ACTIVITY  As tolerated.  Weight bearing as tolerated    CONSULTATIONS  None.    PROCEDURES  Video EEG.    LABORATORY  Lab Results   Component Value Date    SODIUM 137 11/17/2018    SODIUM 138 07/13/2017    POTASSIUM 4.8 11/17/2018    POTASSIUM 4.7 07/13/2017    CHLORIDE 99 11/17/2018    CHLORIDE 103 07/13/2017    CO2 26 11/17/2018    CO2 30 07/13/2017    GLUCOSE 82 11/17/2018    GLUCOSE 75 07/13/2017    BUN 12 11/17/2018    BUN 11 07/13/2017    CREATININE 0.73 11/17/2018     CREATININE 0.68 07/13/2017        Lab Results   Component Value Date    WBC 6.6 11/17/2018    WBC 7.0 07/13/2017    HEMOGLOBIN 13.8 11/17/2018    HEMOGLOBIN 13.1 07/13/2017    HEMATOCRIT 40.4 11/17/2018    HEMATOCRIT 40.4 07/13/2017    PLATELETCT 170 11/17/2018    PLATELETCT 166 07/13/2017        Total time of the discharge process exceeds 42 minutes.

## 2019-08-23 NOTE — PROGRESS NOTES
Discharge instructions reviewed with pt and mother.  IV removed.  Left in stable condition with mother..

## 2019-10-08 RX ORDER — FOLIC ACID 1 MG/1
2 TABLET ORAL DAILY
Qty: 60 TAB | Refills: 11 | Status: SHIPPED | OUTPATIENT
Start: 2019-10-08 | End: 2019-11-26

## 2019-11-25 ENCOUNTER — TELEPHONE (OUTPATIENT)
Dept: NEUROLOGY | Facility: MEDICAL CENTER | Age: 24
End: 2019-11-25

## 2019-11-25 NOTE — TELEPHONE ENCOUNTER
I returned pt's moms call, mary to her answer that dr crowe did not order any blood work for pt to have done prior to her appt with him on 11/26. I did inform mom [facility] 10 min policy.

## 2019-11-26 ENCOUNTER — OFFICE VISIT (OUTPATIENT)
Dept: NEUROLOGY | Facility: MEDICAL CENTER | Age: 24
End: 2019-11-26
Payer: MEDICARE

## 2019-11-26 VITALS
BODY MASS INDEX: 23.22 KG/M2 | SYSTOLIC BLOOD PRESSURE: 102 MMHG | HEART RATE: 66 BPM | DIASTOLIC BLOOD PRESSURE: 68 MMHG | TEMPERATURE: 96.1 F | HEIGHT: 61 IN | RESPIRATION RATE: 14 BRPM | WEIGHT: 123 LBS | OXYGEN SATURATION: 99 %

## 2019-11-26 DIAGNOSIS — G40.219 PHARMACORESISTANT PARTIAL EPILEPSY WITH IMPAIRMENT OF CONSCIOUSNESS, INTRACTABLE (HCC): Chronic | ICD-10-CM

## 2019-11-26 DIAGNOSIS — T42.75XD: ICD-10-CM

## 2019-11-26 DIAGNOSIS — G93.89 ENCEPHALOMALACIA: ICD-10-CM

## 2019-11-26 DIAGNOSIS — E55.9 VITAMIN D DEFICIENCY: ICD-10-CM

## 2019-11-26 DIAGNOSIS — C71.8 MALIGNANT NEOPLASM OF OVERLAPPING SITES OF BRAIN (HCC): ICD-10-CM

## 2019-11-26 DIAGNOSIS — R62.0 DELAYED MILESTONE IN CHILDHOOD: ICD-10-CM

## 2019-11-26 DIAGNOSIS — Z13.31 SCREENING FOR DEPRESSION: ICD-10-CM

## 2019-11-26 PROCEDURE — 99215 OFFICE O/P EST HI 40 MIN: CPT | Performed by: PSYCHIATRY & NEUROLOGY

## 2019-11-26 RX ORDER — CLOBAZAM 10 MG/1
TABLET ORAL
Qty: 105 TAB | Refills: 5 | Status: SHIPPED | OUTPATIENT
Start: 2019-11-26 | End: 2020-01-15 | Stop reason: SDUPTHER

## 2019-11-27 ENCOUNTER — HOSPITAL ENCOUNTER (OUTPATIENT)
Dept: LAB | Facility: MEDICAL CENTER | Age: 24
End: 2019-11-27
Attending: PSYCHIATRY & NEUROLOGY
Payer: MEDICARE

## 2019-11-27 DIAGNOSIS — G40.219 PHARMACORESISTANT PARTIAL EPILEPSY WITH IMPAIRMENT OF CONSCIOUSNESS, INTRACTABLE (HCC): Chronic | ICD-10-CM

## 2019-11-27 LAB
25(OH)D3 SERPL-MCNC: 20 NG/ML (ref 30–100)
ALBUMIN SERPL BCP-MCNC: 4 G/DL (ref 3.2–4.9)
ALBUMIN/GLOB SERPL: 1.1 G/DL
ALP SERPL-CCNC: 66 U/L (ref 30–99)
ALT SERPL-CCNC: 12 U/L (ref 2–50)
ANION GAP SERPL CALC-SCNC: 8 MMOL/L (ref 0–11.9)
AST SERPL-CCNC: 15 U/L (ref 12–45)
BASOPHILS # BLD AUTO: 0.4 % (ref 0–1.8)
BASOPHILS # BLD: 0.03 K/UL (ref 0–0.12)
BILIRUB SERPL-MCNC: 0.2 MG/DL (ref 0.1–1.5)
BUN SERPL-MCNC: 14 MG/DL (ref 8–22)
CALCIUM SERPL-MCNC: 8.7 MG/DL (ref 8.5–10.5)
CHLORIDE SERPL-SCNC: 106 MMOL/L (ref 96–112)
CO2 SERPL-SCNC: 26 MMOL/L (ref 20–33)
CREAT SERPL-MCNC: 0.78 MG/DL (ref 0.5–1.4)
EOSINOPHIL # BLD AUTO: 0.07 K/UL (ref 0–0.51)
EOSINOPHIL NFR BLD: 0.9 % (ref 0–6.9)
ERYTHROCYTE [DISTWIDTH] IN BLOOD BY AUTOMATED COUNT: 42.1 FL (ref 35.9–50)
FOLATE SERPL-MCNC: >22.4 NG/ML
GLOBULIN SER CALC-MCNC: 3.6 G/DL (ref 1.9–3.5)
GLUCOSE SERPL-MCNC: 82 MG/DL (ref 65–99)
HCT VFR BLD AUTO: 43.9 % (ref 37–47)
HGB BLD-MCNC: 14.6 G/DL (ref 12–16)
IMM GRANULOCYTES # BLD AUTO: 0.04 K/UL (ref 0–0.11)
IMM GRANULOCYTES NFR BLD AUTO: 0.5 % (ref 0–0.9)
LYMPHOCYTES # BLD AUTO: 2.43 K/UL (ref 1–4.8)
LYMPHOCYTES NFR BLD: 32.7 % (ref 22–41)
MCH RBC QN AUTO: 32.1 PG (ref 27–33)
MCHC RBC AUTO-ENTMCNC: 33.3 G/DL (ref 33.6–35)
MCV RBC AUTO: 96.5 FL (ref 81.4–97.8)
MONOCYTES # BLD AUTO: 0.53 K/UL (ref 0–0.85)
MONOCYTES NFR BLD AUTO: 7.1 % (ref 0–13.4)
NEUTROPHILS # BLD AUTO: 4.33 K/UL (ref 2–7.15)
NEUTROPHILS NFR BLD: 58.4 % (ref 44–72)
NRBC # BLD AUTO: 0 K/UL
NRBC BLD-RTO: 0 /100 WBC
PLATELET # BLD AUTO: 207 K/UL (ref 164–446)
PMV BLD AUTO: 8.4 FL (ref 9–12.9)
POTASSIUM SERPL-SCNC: 3.8 MMOL/L (ref 3.6–5.5)
PROT SERPL-MCNC: 7.6 G/DL (ref 6–8.2)
RBC # BLD AUTO: 4.55 M/UL (ref 4.2–5.4)
SODIUM SERPL-SCNC: 140 MMOL/L (ref 135–145)
WBC # BLD AUTO: 7.4 K/UL (ref 4.8–10.8)

## 2019-11-27 PROCEDURE — 80053 COMPREHEN METABOLIC PANEL: CPT

## 2019-11-27 PROCEDURE — 85025 COMPLETE CBC W/AUTO DIFF WBC: CPT

## 2019-11-27 PROCEDURE — 82746 ASSAY OF FOLIC ACID SERUM: CPT

## 2019-11-27 PROCEDURE — 36415 COLL VENOUS BLD VENIPUNCTURE: CPT

## 2019-11-27 PROCEDURE — 82306 VITAMIN D 25 HYDROXY: CPT | Mod: GA

## 2019-11-27 NOTE — PROGRESS NOTES
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  The patient continues on Onfi 15 mg in the morning and 20 mg at bedtime, she is also on Briviact 100 mg twice a day.  She is compliant, no side effects.  Most importantly she has been seizure-free on this regimen.  The patient does not drive.  She is not sexually active, and states that she will never be.  She lives at home with her family, they are supportive.  The patient denies any changes in mood, particularly denies any elements of depression, suicidal/homicidal ideation.  The patient denies any other neurological symptoms at this point.  The patient follows up with endocrinology.      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   • Unspecified hemorrhagic conditions     bone marrow transplant       Past surgical history:   Past Surgical History:    Procedure Laterality Date   • PB BONE MARROW/STEM XPLANT,ALLOGENIC  12/22/09   • OTHER NEUROLOGICAL SURG      tumor resection x2, 6/28/09 & 11/20/09   • [place] PROTON BEAM DEL,SIMPLE      completed 30 rounds radiation        Family history:   Family History   Family history unknown: Yes       Social history:   Social History     Socioeconomic History   • Marital status: Single     Spouse name: Not on file   • Number of children: Not on file   • Years of education: Not on file   • Highest education level: Not on file   Occupational History   • Not on file   Social Needs   • Financial resource strain: Not on file   • Food insecurity:     Worry: Not on file     Inability: Not on file   • Transportation needs:     Medical: Not on file     Non-medical: Not on file   Tobacco Use   • Smoking status: Never Smoker   • Smokeless tobacco: Never Used   Substance and Sexual Activity   • Alcohol use: No   • Drug use: No   • Sexual activity: Not on file   Lifestyle   • Physical activity:     Days per week: Not on file     Minutes per session: Not on file   • Stress: Not on file   Relationships   • Social connections:     Talks on phone: Not on file     Gets together: Not on file     Attends Jainism service: Not on file     Active member of club or organization: Not on file     Attends meetings of clubs or organizations: Not on file     Relationship status: Not on file   • Intimate partner violence:     Fear of current or ex partner: Not on file     Emotionally abused: Not on file     Physically abused: Not on file     Forced sexual activity: Not on file   Other Topics Concern   • Not on file   Social History Narrative    Adopted from [place] @ 13 months    Graduated [employer] 6/2014, back at school [employer] for vocational training    Also volunteers at [facility] lives with mom and 2 younger adopted sisters (not bio)       Current medications:   Current Outpatient Medications   Medication   • clobazam (ONFI) 10 MG Tab tablet   • brivaracetam  "(BRIVIACT) 100 MG Tab tablet   • therapeutic multivitamin-minerals (THERAGRAN-M) Tab   • levothyroxine (SYNTHROID) 50 MCG Tab   • norethindrone-ethinyl estradiol (JUNEL FE 1/20) 1-20 MG-MCG per tablet   • hydrocortisone (CORTEF) 5 MG Tab   • medroxyPROGESTERone (PROVERA) 10 MG TABS     No current facility-administered medications for this visit.        Medication Allergy:  Allergies   Allergen Reactions   • Oxcarbazepine Rash   • Lamotrigine          Review of systems:   As indicated in HPI.    Physical examination:   Vitals:    11/26/19 1049   BP: 102/68   BP Location: Left arm   Patient Position: Sitting   BP Cuff Size: Adult   Pulse: 66   Resp: 14   Temp: (!) 35.6 °C (96.1 °F)   TempSrc: Temporal   SpO2: 99%   Weight: 55.8 kg (123 lb)   Height: 1.549 m (5' 1\")     General: Patient in no acute distress, pleasant and cooperative.  HEENT: Normocephalic, no signs of acute trauma.   Neck: supple, no meningeal signs or carotid bruits. There is normal range of motion. No tenderness on exam.   Chest: clear to auscultation. No cough.   CV: RRR, no murmurs.   Skin: no signs of acute rashes or trauma.   Musculoskeletal: joints exhibit full range of motion, without any pain to palpation. There are no signs of joint or muscle swelling. There is no tenderness to deep palpation of muscles.   Psychiatric: No hallucinatory behavior. Denies symptoms of depression or suicidal ideation. Mood and affect appear normal on exam.     NEUROLOGICAL EXAM:   Mental status, orientation: Awake, alert and fully oriented.   Speech and language: speech is clear and fluent. The patient is able to name, repeat and comprehend.   Memory: There is intact recollection of recent and remote events.   Cranial nerve exam: Pupils are 3-4 mm bilaterally and equally reactive to light and accommodation. Visual fields are intact by confrontation. There is no nystagmus on primary or secondary gaze. Intact full EOM in all directions of gaze. Face appears " symmetric. Sensation in the face is intact to light touch. Uvula is midline. Palate elevates symmetrically. Tongue is midline and without any signs of tongue biting or fasciculations.Sternocleidomastoid muscles exhibit is normal strength bilaterally. Shoulder shrug is intact bilaterally.   Motor exam: Strength is 5/5 in all extremities. Tone is normal. No abnormal movements were seen on exam.   Sensory exam reveals normal sense of light touch in all extremities, with hypoesthesia on the right-sided extremities.   Deep tendon reflexes:  2+ throughout. Plantar responses are flexor. There is no clonus.   Coordination: shows a normal finger-nose-finger. Normal rapidly alternating movements.   Gait: The patient was able to get up from seated position on first attempt without requiring assistance. Found to be steady when walking. Movements were fluid with normal arm swing. The patient was able to turn without difficulties or tendency to fall. Romberg exam shows mild swaying in all directions.        ANCILLARY DATA REVIEWED:       Lab Data Review:  Reviewed in chart.    Records reviewed:   Chart reviewed.    Imaging:   MRI brain with and without, 12/11/2018:  1.  There is no recurrent lesion.  2.  Stable postsurgical changes.  3.  Multifocal areas of stable encephalomalacia with chronic hemosiderin deposition in the left temporal, parietal and occipital lobes and left thalamus.  4.  Stable Nonspecific T2 hyperintensities in the frontal white matter likely representing nonspecific foci of gliosis.  (I personally reviewed images).     EEG:  Video EEG, 4/14/2010:  CLINICAL INTERPRETATION:  This is abnormal video EEG monitoring for  patient of this age.  The patient has continuous ictal discharges in  the left hemisphere consisting of spike and wave and polyspike and  wave discharges with a frequency of 1 Hz.  There was no response to  propofol and phenobarbital during this recording.  Clinical  correlation is  required.     Video EEG/EMU, 8/19/2019:  This is an abnormal 5 days video electroencephalogram recording   in the awake, drowsy and sleep state. There is intermittent   slowing in the left hemisphere with frequent left frontotemporal   sharps noted. There are intermittent runs of up 8 seconds long of   rhythmic theta activity in the frontal regions, most pronounced   on the left. A seizure was captured during the study, reported by   patient as heavy sensation on the right arm, difficulties with   words and feeling mildly off.  These events was in keeping with a   left hemisphere focal non-motor seizure, arising from the left   posterior quadrant. The findings confirm diagnosis of focal onset   epilepsy and suggest underlying areas of cortical irritability   and structural abnormality. Clinical and radiological correlation   is recommended.          ASSESSMENT AND PLAN:    1. 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 Do all this for 180 days.  Dispense: 105 Tab; Refill: 5  - CBC WITH DIFFERENTIAL; Future  - Comp Metabolic Panel; Future  - VITAMIN D,25 HYDROXY; Future  - brivaracetam (BRIVIACT) 100 MG Tab tablet; Take 1 Tab by mouth 2 Times a Day for 180 days.  Dispense: 60 Tab; Refill: 5  - FOLATE; Future    2. Malignant neoplasm of overlapping sites of brain (HCC)      3. Delayed milestone in childhood      4. Screening for depression      5. Encephalomalacia      6. Adverse effect of anticonvulsants, subsequent encounter      7. 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  History of left temporal PNET, on remission, followed by yearly MRIs.  Last MRI showed no recurrent lesions (12/11/2018).  Status post resection, chemo and radiation.  Large area of encephalomalacia likely dysfunctional tissue and suspected  epileptogenic to account for her focal onset seizures.  Had not had a seizure in about 2 years, but with sleep deprivation and holding phenytoin had a focal left hemisphere seizure during the last EMU admission in August 2019 (seizure consisting of right arm heaviness, mild difficulties with words, mild confusion).  This meant she will require more than one antiepileptic for seizure control.  The patient had been on Dilantin 300 mg nightly at home, she was complaining of toxicity, fluctuating levels, and they wanted to come off the Dilantin.  The patient apparently had some sort of side effect to Vimpat in the past.  The patient and the mother ultimately decided a combination of Briviact 100 mg twice a day, and clobazam 15 mg in the morning and 20 mg at bedtime.  She is doing great on this regimen, without any side effects.  She will continue unchanged.  Samples for the Briviact were given.     The patient refuses to take folic acid.  She was on folic acid for supplementation while on Dilantin.  The mother requested a recheck level of the folic acid.    The patient will undergo blood work.    The patient and the mother indicate that the patient has never been and will never be sexually active.  They are refusing folic acid and any possible contraception.     The patient does not drive, and will remain as such.     Chronic right sided hypoesthesia and right homonymous hemianopia are postsurgical.     Patient and mother agree with the plan, as outlined.      FOLLOW-UP:   Return in about 3 months (around 2/26/2020).        EDUCATION AND COUNSELING:  -Education was provided to the patient and/or family regarding diagnosis and prognosis. The chronic and unpredictable nature of the condition were discussed. There is increased risk for additional events, which may carry potential for significant injuries and death. Discussed frequent seizure triggers: sleep deprivation, medication non-compliance, use of illegal  drugs/alcohol, stress, and others.   -We reviewed in detail the current antiepileptic regimen. Potential side effects of antiepileptics were discussed at length, including but no limited to: hypersensitivity reactions (rash and others, some of which can be fatal), visual field changes (some of which may be irreversible), glaucoma, diplopia, kidney stones, osteopenia/osteoporosis/bone fractures, hyperthermia/anhydrosis, hyponatremia, tremors/abnormal movements, ataxia, dizziness, fatigue, increased risk for falls, risk for cardiac arrhythmias/syncope, gastrointestinal side effects(hepatitis, pancreatitis, gastritis, ulcers), gingival hypertrophy/bleeding, drowsiness, sedation, anxiety/nervousness, increased risk for suicide, increased risk for depression, and psychosis.   -We also reviewed drug-drug interactions and their potential effect on seizure control and medication side effects.    -We also discussed in detail potential effects of seizures, epilepsy, and medications during pregnancy, including but not limited to fetal malformations, child developmental/intellectual disability, fetal/ risk for hemorrhages, stillbirth, maternal death, premature birth, and others. The patient/family aware that pregnancy should be avoided, unless desired, in which case we recommend discussing with us at least a year prior to planned conception. To avoid undesired pregnancy while on antiepileptics, we recommend dual contraception.  The patient and mother refused, but both states she will not being sexually active.  -Folic acid 2 mg is recommended for all females in childbearing age (12-44 years of age).  The patient refuses, states that she will never be sexually active.  -Recommend chronic vitamin D supplementation and regular exercise (if not contraindicated).   -Patient/family educated on risk for SUDEP (Sudden Death in Epilepsy). Counseling was provided on the importance of strict medication and follow up compliance. The  patient/family understand the risks associated with non-adherence with the medical plan as outlined, including but not limited to an increased risk for breakthrough seizures, which may contribute to injuries, disability, status epilepticus, and even death.   -Counseling was also provided on potential effects of alcohol and other drugs, which may lower seizure threshold and/or affect the metabolism of antiepileptic drugs. We recommend avoidance of alcohol and illegal drugs.  -Avoid sleep deprivation.   -The patient does not drive.   -Other seizure precautions were discussed at length, including no diving, no skydiving, no climbing or exposure to unprotected heights, no unsupervised swimming, no Jacuzzi or bathing in bathtubs or deep bodies of water. The patient/family have been advised about risks for operating any machinery while suffering from seizures / syncope / epilepsy and/or while taking antiepileptic drugs.   -The patient understands and agrees that due to the complexity of his/her diagnosis, results of any testing and further recommendations will typically be discussed/made during a face to face encounter in my office. The patient and/or family further understands it is their responsibility to keep proper follow up.     Patient/family agree with plan, as outlined.         Alberto Herbert MD   Epilepsy and Neurodiagnostics.   Clinical  of Neurology [facility].   Diplomate in Neurology, Epilepsy, and Electrodiagnostic Medicine.   Office: [phone]  Fax: [phone]        BILLING DOCUMENTATION:     I have performed physical exam and reviewed and updated ROS and plan today 11/26/2019. In review of that note, there are no new changes except as documented above.     Counseling:  I spent greater than 50% time face-to-face time of a total of 50 minutes visit. Over 50% of the time of the visit today was spent on counseling and or coordination of  care wtih the patient and/or family, with greater than 50% of the total discussing my assessment and plan as stated above.

## 2019-11-28 RX ORDER — ERGOCALCIFEROL 1.25 MG/1
50000 CAPSULE ORAL
Qty: 4 CAP | Refills: 5 | Status: SHIPPED
Start: 2019-11-28 | End: 2020-03-25

## 2019-12-03 ENCOUNTER — TELEPHONE (OUTPATIENT)
Dept: NEUROLOGY | Facility: MEDICAL CENTER | Age: 24
End: 2019-12-03

## 2019-12-03 NOTE — TELEPHONE ENCOUNTER
Please let pt know, labs ok, vit D is low, RX to pharmacy for weekly supplement ot continue for six months.   Thanks,   RA    Pt verbally understood.

## 2019-12-09 ENCOUNTER — APPOINTMENT (OUTPATIENT)
Dept: INFUSION CENTER | Facility: MEDICAL CENTER | Age: 24
End: 2019-12-09
Attending: NEUROLOGICAL SURGERY
Payer: MEDICARE

## 2019-12-12 ENCOUNTER — OFFICE VISIT (OUTPATIENT)
Dept: PEDIATRIC ENDOCRINOLOGY | Facility: MEDICAL CENTER | Age: 24
End: 2019-12-12
Payer: MEDICARE

## 2019-12-12 VITALS
HEART RATE: 68 BPM | HEIGHT: 61 IN | DIASTOLIC BLOOD PRESSURE: 68 MMHG | SYSTOLIC BLOOD PRESSURE: 96 MMHG | BODY MASS INDEX: 23.37 KG/M2 | WEIGHT: 123.8 LBS

## 2019-12-12 DIAGNOSIS — R62.50 UNSPECIFIED LACK OF EXPECTED NORMAL PHYSIOLOGICAL DEVELOPMENT IN CHILDHOOD: ICD-10-CM

## 2019-12-12 DIAGNOSIS — R62.0 DELAYED MILESTONE IN CHILDHOOD: ICD-10-CM

## 2019-12-12 DIAGNOSIS — C71.8 MALIGNANT NEOPLASM OF OVERLAPPING SITES OF BRAIN (HCC): ICD-10-CM

## 2019-12-12 DIAGNOSIS — E23.0 PANHYPOPITUITARISM (HCC): ICD-10-CM

## 2019-12-12 DIAGNOSIS — E23.0 HYPOPITUITARISM (HCC): ICD-10-CM

## 2019-12-12 DIAGNOSIS — G40.909 SEIZURE DISORDER (HCC): ICD-10-CM

## 2019-12-12 DIAGNOSIS — R94.7 ABNORMAL RESULTS OF OTHER ENDOCRINE FUNCTION STUDIES: ICD-10-CM

## 2019-12-12 PROCEDURE — 99214 OFFICE O/P EST MOD 30 MIN: CPT | Performed by: PEDIATRICS

## 2019-12-12 RX ORDER — LORAZEPAM 2 MG/1
2 TABLET ORAL PRN
COMMUNITY
End: 2020-06-16

## 2019-12-12 RX ORDER — HYDROCORTISONE 5 MG/1
5 TABLET ORAL 2 TIMES DAILY
Qty: 70 TAB | Refills: 6 | Status: SHIPPED | OUTPATIENT
Start: 2019-12-12 | End: 2020-01-23 | Stop reason: SDUPTHER

## 2019-12-12 RX ORDER — LEVOTHYROXINE SODIUM 0.05 MG/1
50 TABLET ORAL
Qty: 30 TAB | Refills: 4 | Status: SHIPPED | OUTPATIENT
Start: 2019-12-12 | End: 2020-02-06 | Stop reason: SDUPTHER

## 2019-12-12 RX ORDER — NORETHINDRONE ACETATE AND ETHINYL ESTRADIOL 1MG-20(21)
1 KIT ORAL DAILY
Qty: 28 TAB | Refills: 6 | Status: SHIPPED | OUTPATIENT
Start: 2019-12-12 | End: 2020-02-06 | Stop reason: SDUPTHER

## 2019-12-12 RX ORDER — NORETHINDRONE ACETATE AND ETHINYL ESTRADIOL 1MG-20(21)
1 KIT ORAL DAILY
Qty: 28 TAB | Refills: 6 | Status: SHIPPED | OUTPATIENT
Start: 2019-12-12 | End: 2019-12-12 | Stop reason: SDUPTHER

## 2019-12-12 NOTE — PROGRESS NOTES
"Subjective:     Chief Complaint   Patient presents with   • Follow-Up   • Hypopituitarism       Past endocrine history:  Miles Paredes is a 24 y.o. 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  She underwent a partial resection of the tumor followed by chemotherapy, cranial spinal irradiation (unknown dose) and ultimately an autologous stem cell transplant.  Subsequent to that, she has had issues with significant seizure activity as well as hypopituitarism.  Additionally, she had primary ovarian failure as well.     Currently, she has TSH as well as ACTH deficiencies but primary ovarian failure.  In the past as well as most recently, her growth and levels have been normal.  She has never been on growth and on in the past.  She currently is on levothyroxine, hydrocortisone and alternating doses of Premarin and Provera separately.  Many many years ago she was on a birth control pill but had significant breakthrough bleeding.  However in the visit today the family did not recall that.  We did talk about trying to switch her back to birth control pills just for ease of use as well as improvement in physiology with the goal of trying to be as close to normal physiology as possible.     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  Now they state that she has not had a seizure in about a year.  However, she did have a very significant seizure which was grand mal in nature a few years ago.     History of present illness:     She continues on her replacement hormones including hydrocortisone, Synthroid and oral contraceptives.  She has had the most issues as of late with her Dilantin level being too high.  Her neurologist is helping to wean this dose down a little bit.  Mom states that this presented with \"skin pain\" and feeling very tired at which point they did labs and discover this.  The labs also showed evidence of a " past EBV infection but not any current evidence.     Her most recent labs also showed a normal free T4.  Please see the scanned EMR data otherwise, her other labs were done in November 2018, all of which were normal.     It was difficult to tease out what her cycles are actually doing in terms of any breakthrough bleeding.  She thinks that she is having some spotting or patrick periods on around the second or third week of her PACs.  However is not exactly clear how consistent her adherence is.  She is at this point in charge of her own medications although her mom oversees it to some extent.     She has not had a seizure in over 3 years fortunately.  Otherwise, her general health is been good.  She is not wearing a medic alert bracelet which we again discussed that she really needs to have that as well as make her work aware that she needs hydrocortisone or Solu-Cortef and immediate attention should she have a significant injury or illness.     They were also given handouts again today with regards to when to use Solu-Cortef, triple doses, etc.       History of present illness:    She has been doing well on the oral contraceptives versus separate Premarin and Provera.  In addition, she is taking her levothyroxine and hydrocortisone without any difficulties.  She is only having menstrual cycles at the time of the placebo pills and no spotting in between.  Significantly less cramps, heavy bleeding, etc. on this regimen.  Overall, she does seem to be doing better developmentally as well.  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  She is not any recent illnesses or other problems.  We did go discuss when to use stress coverage of hydrocortisone, Solu-Cortef, etc.    Otherwise general health has been stable.  Social history: The patient lives with her mom as well as siblings.  She does work in a  about 3 hours a day.       Hospital Outpatient Visit on 11/27/2019    Component Date Value Ref Range Status   • Folate -Folic Acid 11/27/2019 >22.4  >4.0 ng/mL Final   • 25-Hydroxy   Vitamin D 25 11/27/2019 20* 30 - 100 ng/mL Final    Comment: Adult Ranges:   <20 ng/mL - Deficiency  20-29 ng/mL - Insufficiency   ng/mL - Sufficiency  The [employer] Centaur Vitamin D Assay is standardized to the  [facility] reference measurement procedures, a  reference method for the Vitamin D Standardization Program  (VDSP).  The VDSP aligns patient results among 25 (OH)  Vitamin D methods.     • Sodium 11/27/2019 140  135 - 145 mmol/L Final   • Potassium 11/27/2019 3.8  3.6 - 5.5 mmol/L Final   • Chloride 11/27/2019 106  96 - 112 mmol/L Final   • Co2 11/27/2019 26  20 - 33 mmol/L Final   • Anion Gap 11/27/2019 8.0  0.0 - 11.9 Final   • Glucose 11/27/2019 82  65 - 99 mg/dL Final   • Bun 11/27/2019 14  8 - 22 mg/dL Final   • Creatinine 11/27/2019 0.78  0.50 - 1.40 mg/dL Final   • Calcium 11/27/2019 8.7  8.5 - 10.5 mg/dL Final   • AST(SGOT) 11/27/2019 15  12 - 45 U/L Final   • ALT(SGPT) 11/27/2019 12  2 - 50 U/L Final   • Alkaline Phosphatase 11/27/2019 66  30 - 99 U/L Final   • Total Bilirubin 11/27/2019 0.2  0.1 - 1.5 mg/dL Final   • Albumin 11/27/2019 4.0  3.2 - 4.9 g/dL Final   • Total Protein 11/27/2019 7.6  6.0 - 8.2 g/dL Final   • Globulin 11/27/2019 3.6* 1.9 - 3.5 g/dL Final   • A-G Ratio 11/27/2019 1.1  g/dL Final   • WBC 11/27/2019 7.4  4.8 - 10.8 K/uL Final   • RBC 11/27/2019 4.55  4.20 - 5.40 M/uL Final   • Hemoglobin 11/27/2019 14.6  12.0 - 16.0 g/dL Final   • Hematocrit 11/27/2019 43.9  37.0 - 47.0 % Final   • MCV 11/27/2019 96.5  81.4 - 97.8 fL Final   • MCH 11/27/2019 32.1  27.0 - 33.0 pg Final   • MCHC 11/27/2019 33.3* 33.6 - 35.0 g/dL Final   • RDW 11/27/2019 42.1  35.9 - 50.0 fL Final   • Platelet Count 11/27/2019 207  164 - 446 K/uL Final   • MPV 11/27/2019 8.4* 9.0 - 12.9 fL Final   • Neutrophils-Polys 11/27/2019 58.40  44.00 - 72.00 % Final   • Lymphocytes 11/27/2019 32.70   22.00 - 41.00 % Final   • Monocytes 11/27/2019 7.10  0.00 - 13.40 % Final   • Eosinophils 11/27/2019 0.90  0.00 - 6.90 % Final   • Basophils 11/27/2019 0.40  0.00 - 1.80 % Final   • Immature Granulocytes 11/27/2019 0.50  0.00 - 0.90 % Final   • Nucleated RBC 11/27/2019 0.00  /100 WBC Final   • Neutrophils (Absolute) 11/27/2019 4.33  2.00 - 7.15 K/uL Final    Includes immature neutrophils, if present.   • Lymphs (Absolute) 11/27/2019 2.43  1.00 - 4.80 K/uL Final   • Monos (Absolute) 11/27/2019 0.53  0.00 - 0.85 K/uL Final   • Eos (Absolute) 11/27/2019 0.07  0.00 - 0.51 K/uL Final   • Baso (Absolute) 11/27/2019 0.03  0.00 - 0.12 K/uL Final   • Immature Granulocytes (abs) 11/27/2019 0.04  0.00 - 0.11 K/uL Final   • NRBC (Absolute) 11/27/2019 0.00  K/uL Final   • GFR If  11/27/2019 >60  >60 mL/min/1.73 m 2 Final   • GFR If Non  11/27/2019 >60  >60 mL/min/1.73 m 2 Final       ROS  Constitutional: Negative for fever, chills, weight loss, malaise/fatigue and diaphoresis.   HENT: Negative for congestion, ear discharge, ear pain, hearing loss, nosebleeds, sore throat and tinnitus.   Eyes: Negative for blurred vision, double vision, photophobia, pain, discharge and redness.   Respiratory: Negative for cough, hemoptysis, sputum production, shortness of breath, wheezing and stridor.    Cardiovascular: Negative for chest pain, palpitations, orthopnea, claudication, leg swelling and PND.   Gastrointestinal: Negative for heartburn, nausea, vomiting, abdominal pain, diarrhea, constipation, blood in stool and melena.   Genitourinary: Negative for dysuria, urgency, frequency, hematuria and flank pain.  Musculoskeletal: Negative for myalgias, back pain, joint pain, falls and neck pain.   Skin: Negative for itching and rash.   Neurological: Negative for dizziness, tingling, tremors, sensory change, speech change, focal weakness, seizures, loss of consciousness, weakness and headaches.    Endo/Heme/Allergies: Negative for environmental allergies and polydipsia. Does not bruise/bleed easily.   Psychiatric/Behavioral: Negative for depression, suicidal ideas, hallucinations, memory loss and substance abuse. The patient is not nervous/anxious and does not have insomnia.     Allergies   Allergen Reactions   • Oxcarbazepine Rash   • Lamotrigine        Current Outpatient Medications   Medication Sig Dispense Refill   • hydrocortisone sodium succinate PF (SOLU-CORTEF) 100 MG Recon Soln injection 100 mg by Intravenous route Once PRN.     • LORazepam (ATIVAN) 2 MG tablet Take 2 mg by mouth as needed for Anxiety (seizure lasting greater than 5 minutes).     • norethindrone-ethinyl estradiol (JUNEL FE 1/20) 1-20 MG-MCG per tablet Take 1 Tab by mouth every day. 28 Tab 6   • levothyroxine (SYNTHROID) 50 MCG Tab Take 1 Tab by mouth Every morning on an empty stomach. 30 Tab 4   • hydrocortisone (CORTEF) 5 MG Tab Take 1 Tab by mouth 2 Times a Day. Take one tablet by mouth twice daily and triple dose for illness or other stress 70 Tab 6   • ergocalciferol (DRISDOL) 59233 UNIT capsule Take 1 Cap by mouth every 7 days. 4 Cap 5   • clobazam (ONFI) 10 MG Tab tablet Take 1.5 Tabs by mouth every day AND 2 Tabs every bedtime. Do all this for 180 days. 105 Tab 5   • brivaracetam (BRIVIACT) 100 MG Tab tablet Take 1 Tab by mouth 2 Times a Day for 180 days. 60 Tab 5   • therapeutic multivitamin-minerals (THERAGRAN-M) Tab Take 1 Tab by mouth every day. 30 Tab 11     No current facility-administered medications for this visit.        Social History     Socioeconomic History   • Marital status: Single     Spouse name: Not on file   • Number of children: Not on file   • Years of education: Not on file   • Highest education level: Not on file   Occupational History   • Not on file   Social Needs   • Financial resource strain: Not on file   • Food insecurity:     Worry: Not on file     Inability: Not on file   • Transportation  "needs:     Medical: Not on file     Non-medical: Not on file   Tobacco Use   • Smoking status: Never Smoker   • Smokeless tobacco: Never Used   Substance and Sexual Activity   • Alcohol use: No   • Drug use: No   • Sexual activity: Not on file   Lifestyle   • Physical activity:     Days per week: Not on file     Minutes per session: Not on file   • Stress: Not on file   Relationships   • Social connections:     Talks on phone: Not on file     Gets together: Not on file     Attends [employer] service: Not on file     Active member of club or organization: Not on file     Attends meetings of clubs or organizations: Not on file     Relationship status: Not on file   • Intimate partner violence:     Fear of current or ex partner: Not on file     Emotionally abused: Not on file     Physically abused: Not on file     Forced sexual activity: Not on file   Other Topics Concern   • Not on file   Social History Narrative    Adopted from [place] @ 13 months    Graduated [employer] 6/2014, back at school [employer] for vocational training    Also volunteers at [facility] lives with mom and 2 younger adopted sisters (not bio)          Objective:   BP (!) 96/68 (BP Location: Left arm, Patient Position: Sitting)   Pulse 68   Ht 1.547 m (5' 0.9\")   Wt 56.2 kg (123 lb 12.8 oz)     Physical Exam   Constitutional: she is oriented to person, place, and time. she appears well-developed and well-nourished.  Skin: Skin is warm and dry.   Head: Normocephalic and atraumatic.    Eyes: Pupils are equal, round, and reactive to light. No scleral icterus.  Mouth/Throat: Tongue normal. Oropharynx is clear and moist. Posterior pharynx without erythema or exudates.  Neck: Supple, trachea midline. No thyromegaly present.   Cardiovascular: Normal rate and regular rhythm.  Chest: Effort normal. Clear to auscultation throughout. No adventitious sounds.  Abdominal: Soft, non tender, and without distention. Active bowel sounds in all four quadrants. No rebound, " guarding, masses or hepatosplenomegaly.  Extremities: No cyanosis, clubbing, erythema, nor edema.   Neurological: she is alert and oriented to person, place, and time. she has normal reflexes.   : Micha  Psychiatric: she has a normal mood and affect. her behavior is normal.  Delayed.       Assessment and Plan:   The following treatment plan was discussed:     1. Hypopituitarism (HCC)  Comp Metabolic Panel    T4 Free    Vitamin D, 25 Hydroxy    IGF 1 Somatomedin    norethindrone-ethinyl estradiol (JUNEL FE 1/20) 1-20 MG-MCG per tablet    levothyroxine (SYNTHROID) 50 MCG Tab    DISCONTINUED: norethindrone-ethinyl estradiol (JUNEL FE 1/20) 1-20 MG-MCG per tablet   2. Panhypopituitarism (HCC)  hydrocortisone (CORTEF) 5 MG Tab   3. Unspecified lack of expected normal physiological development in childhood     4. Delayed milestone in childhood     5. Abnormal results of other endocrine function studies     6. Seizure disorder (HCC)     7. 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  Therefore, she should be given extra hydrocortisone in times of stress.  If she has a fever >100.5, an illness that is making her sicker than a simple cold or is vomiting or has diarrhea, administer three times her usual dose of hydrocortisone by mouth.  Give her triple her usual dose until the fever resolves and you feel that she is improving.    If she vomits the dose, wait 30 minutes and give it to her again.  If she still vomits the dose give her an injection of SoluCortef as discussed in the office.  It is extremely important that she get the extra hydrocortisone dose when she is ill.  If she requires surgery, admission to the hospital, breaks a bone or is vomiting she must receive SoluCortef - a high dose of hydrocortisone that is in an injection form.  SoluCortef comes in a vial as a powder  which you must mix with sterile water and give as an intramuscular injection.  If she is hospitalized, the dose will be given through an intravenous catheter.            Follow-Up: Return in about 6 months (around 6/12/2020).

## 2020-01-10 ENCOUNTER — TELEPHONE (OUTPATIENT)
Dept: NEUROLOGY | Facility: MEDICAL CENTER | Age: 25
End: 2020-01-10

## 2020-01-10 NOTE — TELEPHONE ENCOUNTER
Patient and mother lvm stating in the last two months that pt has been getting blister/ hives all over her body at least 1-2 times a week. Pt did go to the urgent care recently and was told by the doctor that it may be an allergy to food? Mom and pt think its the briviact but are not sure, pt has been taking benadryl and the hives always go away after taking. Pt has an appt with you in march but they do not want to wait for a response back til then from you.  Please advise.

## 2020-01-11 NOTE — TELEPHONE ENCOUNTER
I have not seen it with Briviact but could be. Does she have blisters now?   She can come on Wednesday at 10 am?   RA      I lvm for mom or pt to call back to conf appt.

## 2020-01-15 ENCOUNTER — OFFICE VISIT (OUTPATIENT)
Dept: NEUROLOGY | Facility: MEDICAL CENTER | Age: 25
End: 2020-01-15
Payer: MEDICARE

## 2020-01-15 ENCOUNTER — TELEPHONE (OUTPATIENT)
Dept: NEUROLOGY | Facility: MEDICAL CENTER | Age: 25
End: 2020-01-15

## 2020-01-15 VITALS
RESPIRATION RATE: 16 BRPM | DIASTOLIC BLOOD PRESSURE: 66 MMHG | BODY MASS INDEX: 24.1 KG/M2 | TEMPERATURE: 96.3 F | WEIGHT: 127.65 LBS | HEART RATE: 74 BPM | OXYGEN SATURATION: 98 % | SYSTOLIC BLOOD PRESSURE: 106 MMHG | HEIGHT: 61 IN

## 2020-01-15 DIAGNOSIS — Z13.31 SCREENING FOR DEPRESSION: ICD-10-CM

## 2020-01-15 DIAGNOSIS — E23.0 PANHYPOPITUITARISM (HCC): ICD-10-CM

## 2020-01-15 DIAGNOSIS — E55.9 VITAMIN D DEFICIENCY: ICD-10-CM

## 2020-01-15 DIAGNOSIS — G40.909 SEIZURE DISORDER (HCC): ICD-10-CM

## 2020-01-15 DIAGNOSIS — G40.219 PHARMACORESISTANT PARTIAL EPILEPSY WITH IMPAIRMENT OF CONSCIOUSNESS, INTRACTABLE (HCC): Chronic | ICD-10-CM

## 2020-01-15 DIAGNOSIS — C71.8 MALIGNANT NEOPLASM OF OVERLAPPING SITES OF BRAIN (HCC): ICD-10-CM

## 2020-01-15 DIAGNOSIS — T42.75XA ADVERSE EFFECT OF ANTIEPILEPTIC, INITIAL ENCOUNTER: ICD-10-CM

## 2020-01-15 PROCEDURE — 99215 OFFICE O/P EST HI 40 MIN: CPT | Performed by: PSYCHIATRY & NEUROLOGY

## 2020-01-15 RX ORDER — CLOBAZAM 10 MG/1
TABLET ORAL
Qty: 105 TAB | Refills: 5 | Status: SHIPPED | OUTPATIENT
Start: 2020-01-15 | End: 2020-03-25 | Stop reason: SDUPTHER

## 2020-01-15 NOTE — TELEPHONE ENCOUNTER
[facility] in [place] called me stated they do not carry the script on[facility]. I called pt and asked if there was another preferred pharmacy I can call. Mom/ pt stated [facility]. I called several times no answer. I lvm for someone to call me back so I can get a medication filled for a pt.

## 2020-01-16 NOTE — PROGRESS NOTES
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  The patient's mother had called requesting an emergency appointment, over the last several weeks, exactly since the last week of November, the patient had developed intermittent rashes throughout the body, they provided pictures today on the patient's iPhone, these rashes appear to be in the trunk, and the proximal extremities with a morbilliform appearance.  The patient's mother initially indicated hives but no hives or blisters were seen on the pictures.  No oral lesions.  The patient continues on Briviact 100 mg twice a day, and clobazam.  Her clobazam was switched over by her insurance from a brand name to a generic at the end of November, coinciding with the rash.  No new other medications or changes have been made.  The rash is not present today, but has come and gone over the last few weeks.  The patient remains seizure-free.  She denies any other side effects at this point.    Her mood is good, denies any current symptoms of depression and or suicidal ideation.        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   • Unspecified hemorrhagic conditions     bone marrow transplant       Past surgical history:   Past Surgical History:   Procedure Laterality Date   • PB BONE MARROW/STEM XPLANT,ALLOGENIC  12/22/09   • OTHER NEUROLOGICAL SURG      tumor resection x2, 6/28/09 & 11/20/09   • PA PROTON BEAM DEL,SIMPLE      completed 30 rounds radiation        Family history:   Family History   Family history unknown: Yes       Social history:   Social History     Socioeconomic History   • Marital status: Single     Spouse name: Not on file   • Number of children: Not on file   • Years of education: Not on file   • Highest education level: Not on file   Occupational History   • Not on file   Social Needs   • Financial resource strain: Not on file   • Food insecurity:     Worry: Not on file     Inability: Not on file   • Transportation needs:     Medical: Not on file     Non-medical: Not on file   Tobacco Use   • Smoking status: Never Smoker   • Smokeless tobacco: Never Used   Substance and Sexual Activity   • Alcohol use: No   • Drug use: No   • Sexual activity: Not on file   Lifestyle   • Physical activity:     Days per week: Not on file     Minutes per session: Not on file   • Stress: Not on file   Relationships   • Social connections:     Talks on phone: Not on file     Gets together: Not on file     Attends Restorationist service: Not on file     Active member of club or organization: Not on file     Attends meetings of clubs or organizations: Not on file     Relationship status: Not on file   • Intimate partner violence:     Fear of current or ex partner: Not on file     Emotionally abused: Not on  "file     Physically abused: Not on file     Forced sexual activity: Not on file   Other Topics Concern   • Not on file   Social History Narrative    Adopted from [place] @ 13 months    Graduated [employer] 6/2014, back at school [employer] for vocational training    Also volunteers at [facility] lives with mom and 2 younger adopted sisters (not bio)       Current medications:   Current Outpatient Medications   Medication   • ONFI 10 MG Tab tablet   • brivaracetam (BRIVIACT) 100 MG Tab tablet   • hydrocortisone sodium succinate PF (SOLU-CORTEF) 100 MG Recon Soln injection   • LORazepam (ATIVAN) 2 MG tablet   • norethindrone-ethinyl estradiol (JUNEL FE 1/20) 1-20 MG-MCG per tablet   • levothyroxine (SYNTHROID) 50 MCG Tab   • hydrocortisone (CORTEF) 5 MG Tab   • ergocalciferol (DRISDOL) 50146 UNIT capsule   • therapeutic multivitamin-minerals (THERAGRAN-M) Tab     No current facility-administered medications for this visit.        Medication Allergy:  Allergies   Allergen Reactions   • Oxcarbazepine Rash   • Lamotrigine          Review of systems:   As reviewed in today's history.    Physical examination:   Vitals:    01/15/20 0952   BP: 106/66   BP Location: Right arm   Patient Position: Sitting   BP Cuff Size: Adult   Pulse: 74   Resp: 16   Temp: (!) 35.7 °C (96.3 °F)   TempSrc: Temporal   SpO2: 98%   Weight: 57.9 kg (127 lb 10.3 oz)   Height: 1.549 m (5' 1\")     General: Patient in no acute distress, pleasant and cooperative.  HEENT: Normocephalic, no signs of acute trauma.   Neck: supple, no meningeal signs or carotid bruits. There is normal range of motion. No tenderness on exam.   Chest: clear to auscultation. No cough.   CV: RRR, no murmurs.   Skin: no signs of acute rashes or trauma.   Musculoskeletal: joints exhibit full range of motion, without any pain to palpation. There are no signs of joint or muscle swelling. There is no tenderness to deep palpation of muscles.   Psychiatric: No hallucinatory behavior. Denies symptoms of " depression or suicidal ideation. Mood and affect appear normal on exam.     NEUROLOGICAL EXAM:   Mental status, orientation: Awake, alert and fully oriented.   Speech and language: speech is clear and fluent. The patient is able to name, repeat and comprehend.   Memory: There is intact recollection of recent and remote events.   Cranial nerve exam: Pupils are 3-4 mm bilaterally and equally reactive to light and accommodation. Visual fields by confrontation showed a right homonymous hemianopia, this is a stable. There is no nystagmus on primary or secondary gaze. Intact full EOM in all directions of gaze. Face appears symmetric. Sensation in the face is intact to light touch. Uvula is midline. Palate elevates symmetrically. Tongue is midline and without any signs of tongue biting or fasciculations.Sternocleidomastoid muscles exhibit is normal strength bilaterally. Shoulder shrug is intact bilaterally.   Motor exam: Strength is 5/5 in all extremities. Tone is normal. No abnormal movements were seen on exam.   Sensory exam reveals normal sense of light touch in all extremities.   Deep tendon reflexes:  2+ throughout. Plantar responses are flexor. There is no clonus.   Coordination: shows a normal finger-nose-finger. Normal rapidly alternating movements.   Gait: The patient was able to get up from seated position on first attempt without requiring assistance. Found to be steady when walking. Movements were fluid with normal arm swing. The patient was able to turn without difficulties or tendency to fall. Romberg exam showed mild swaying in all directions.        ANCILLARY DATA REVIEWED:       Lab Data Review:  Reviewed in chart.    Records reviewed:   Chart reviewed.       Imaging:   MRI brain with and without, 12/11/2018:  1.  There is no recurrent lesion.  2.  Stable postsurgical changes.  3.  Multifocal areas of stable encephalomalacia with chronic hemosiderin deposition in the left temporal, parietal and occipital  lobes and left thalamus.  4.  Stable Nonspecific T2 hyperintensities in the frontal white matter likely representing nonspecific foci of gliosis.  (I personally reviewed images).     EEG:  Video EEG, 4/14/2010:  CLINICAL INTERPRETATION:  This is abnormal video EEG monitoring for  patient of this age.  The patient has continuous ictal discharges in  the left hemisphere consisting of spike and wave and polyspike and  wave discharges with a frequency of 1 Hz.  There was no response to  propofol and phenobarbital during this recording.  Clinical  correlation is required.     Video EEG/EMU, 8/19/2019:  This is an abnormal 5 days video electroencephalogram recording   in the awake, drowsy and sleep state. There is intermittent   slowing in the left hemisphere with frequent left frontotemporal   sharps noted. There are intermittent runs of up 8 seconds long of   rhythmic theta activity in the frontal regions, most pronounced   on the left. A seizure was captured during the study, reported by   patient as heavy sensation on the right arm, difficulties with   words and feeling mildly off.  These events was in keeping with a   left hemisphere focal non-motor seizure, arising from the left   posterior quadrant. The findings confirm diagnosis of focal onset   epilepsy and suggest underlying areas of cortical irritability   and structural abnormality. Clinical and radiological correlation   is recommended.            ASSESSMENT AND PLAN:    1. Malignant neoplasm of overlapping sites of brain (HCC)      2. Seizure disorder (HCC)      3. Panhypopituitarism (HCC)      4. Adverse effect of antiepileptic, initial encounter      5. 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 Do all this for 180 days.  Dispense: 105 Tab; Refill: 5  - brivaracetam (BRIVIACT) 100 MG Tab tablet; Take 1 Tab by mouth 2 Times a Day  for 180 days.  Dispense: 60 Tab; Refill: 5    6. Vitamin D deficiency      7. 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  History of left temporal PNET, on remission, followed by yearly MRIs.  Last MRI showed no recurrent lesions (12/11/2018).  Status post resection, chemo and radiation.  Large area of encephalomalacia likely dysfunctional tissue and suspected epileptogenic focus to account for her focal onset seizures.  Had not had a seizure in about 2 years, but with sleep deprivation and holding phenytoin had a focal left hemisphere seizure during the last EMU admission in August 2019 (seizure consisting of right arm heaviness, mild difficulties with words, mild confusion).  This meant she will require more than one antiepileptic for seizure control.  The patient had been on Dilantin 300 mg nightly at home, she was complaining of toxicity, fluctuating levels, and they wanted to come off the Dilantin.  The patient apparently also had some sort of side effect to Vimpat in the past, as well as Keppra.  The patient and the mother ultimately decided a combination of Briviact 100 mg twice a day, and clobazam 15 mg in the morning and 20 mg at bedtime.  She has been doing great on this regimen, without any side effects, except for a new onset of a rash since the last week of November.  I called her pharmacist, and confirmed that the globus and had been changed from brand name to a generic a few days prior to the rash first appearing.  I believe this is likely the culprit.  I have written a new prescription for the Onfi, requesting brand name only, this was faxed to the pharmacy, and discussed with her pharmacist.  She will continue unchanged, except for Onfi being required to be brand name only.       The patient continues to refuse to take folic acid.  She was on folic acid for supplementation while on Dilantin.  The mother requested a recheck level of the  folic acid.  This will be done within the next few weeks.     The patient will undergo blood work before the next appointment.     The patient and the mother indicate that the patient has never been and will never be sexually active.  They are refusing folic acid and any possible contraception.     The patient does not drive, and will remain as such.  The patient's mother will call us if the rash continues after a couple of weeks of being back on the brand name and clobazam, or any worsening, they should go to the emergency department.     Chronic right sided hypoesthesia and right homonymous hemianopia are postsurgical.     Patient and mother agree with the plan, as outlined.           FOLLOW-UP:   Return in about 3 months (around 4/15/2020).      EDUCATION AND COUNSELING:  -Education was provided to the patient and/or family regarding diagnosis and prognosis. The chronic and unpredictable nature of the condition were discussed. There is increased risk for additional events, which may carry potential for significant injuries and death. Discussed frequent seizure triggers: sleep deprivation, medication non-compliance, use of illegal drugs/alcohol, stress, and others.   -We reviewed in detail the current antiepileptic regimen. Potential side effects of antiepileptics were discussed at length, including but no limited to: hypersensitivity reactions (rash and others, some of which can be fatal), visual field changes (some of which may be irreversible), glaucoma, diplopia, kidney stones, osteopenia/osteoporosis/bone fractures, hyperthermia/anhydrosis, hyponatremia, tremors/abnormal movements, ataxia, dizziness, fatigue, increased risk for falls, risk for cardiac arrhythmias/syncope, gastrointestinal side effects(hepatitis, pancreatitis, gastritis, ulcers), gingival hypertrophy/bleeding, drowsiness, sedation, anxiety/nervousness, increased risk for suicide, increased risk for depression, and psychosis.   -We also  reviewed drug-drug interactions and their potential effect on seizure control and medication side effects.    -We also discussed in detail potential effects of seizures, epilepsy, and medications during pregnancy, including but not limited to fetal malformations, child developmental/intellectual disability, fetal/ risk for hemorrhages, stillbirth, maternal death, premature birth, and others. The patient/family aware that pregnancy should be avoided, unless desired, in which case we recommend discussing with us at least a year prior to planned conception. To avoid undesired pregnancy while on antiepileptics, we recommend dual contraception.   -Folic acid 2 mg is recommended for all females in childbearing age (12-44 years of age).   -Recommend chronic vitamin D supplementation and regular exercise (if not contraindicated).   -Patient/family educated on risk for SUDEP (Sudden Death in Epilepsy). Counseling was provided on the importance of strict medication and follow up compliance. The patient/family understand the risks associated with non-adherence with the medical plan as outlined, including but not limited to an increased risk for breakthrough seizures, which may contribute to injuries, disability, status epilepticus, and even death.   -Avoid sleep deprivation.   -We extensively discussed the aspects related to safety in drivers who suffer from epilepsy. The patient is encourage to report to the Division of Motor Vehicles of any condition and/or spells related to confusion, disorientation, and/or loss of awareness and/or loss of consciousness; as these may pose a safety issue if they occur while operating a motor vehicle. The patient and/or family are ultimately responsible for exercising caution and abiding to regulations in place.  The patient does not drive.  -Other seizure precautions were discussed at length, including no diving, no skydiving, no climbing or exposure to unprotected heights, no  unsupervised swimming, no Jacuzzi or bathing in bathtubs or deep bodies of water. The patient/family have been advised about risks for operating any machinery while suffering from seizures / syncope / epilepsy and/or while taking antiepileptic drugs.   -The patient understands and agrees that due to the complexity of his/her diagnosis, results of any testing and further recommendations will typically be discussed/made during a face to face encounter in my office. The patient and/or family further understands it is their responsibility to keep proper follow up.     Patient/family agree with plan, as outlined.         Alberto Herbert MD   Epilepsy and Neurodiagnostics.   Clinical  of Neurology [facility].   Diplomate in Neurology, Epilepsy, and Electrodiagnostic Medicine.   Office: [phone]  Fax: [phone]      BILLING DOCUMENTATION:     I have performed physical exam and reviewed and updated ROS and plan today 1/15/2020. In review of that note, there are no new changes except as documented above.     Counseling:  I spent greater than 50% time face-to-face time of a total of 54 minutes visit. Over 50% of the time of the visit today was spent on counseling and or coordination of care wtih the patient and/or family, with greater than 50% of the total discussing my assessment and plan as stated above.

## 2020-01-23 DIAGNOSIS — E23.0 PANHYPOPITUITARISM (HCC): ICD-10-CM

## 2020-01-23 RX ORDER — HYDROCORTISONE 5 MG/1
5 TABLET ORAL 2 TIMES DAILY
Qty: 70 TAB | Refills: 6 | Status: SHIPPED | OUTPATIENT
Start: 2020-01-23 | End: 2020-02-06 | Stop reason: SDUPTHER

## 2020-02-06 DIAGNOSIS — E23.0 HYPOPITUITARISM (HCC): ICD-10-CM

## 2020-02-06 DIAGNOSIS — E23.0 PANHYPOPITUITARISM (HCC): ICD-10-CM

## 2020-02-06 RX ORDER — NORETHINDRONE ACETATE AND ETHINYL ESTRADIOL 1MG-20(21)
1 KIT ORAL DAILY
Qty: 28 TAB | Refills: 6 | Status: SHIPPED | OUTPATIENT
Start: 2020-02-06 | End: 2020-08-04

## 2020-02-06 RX ORDER — LEVOTHYROXINE SODIUM 0.05 MG/1
50 TABLET ORAL
Qty: 30 TAB | Refills: 0 | Status: SHIPPED | OUTPATIENT
Start: 2020-02-06 | End: 2020-03-20

## 2020-02-06 RX ORDER — HYDROCORTISONE 5 MG/1
5 TABLET ORAL 2 TIMES DAILY
Qty: 70 TAB | Refills: 0 | Status: SHIPPED | OUTPATIENT
Start: 2020-02-06 | End: 2020-03-20

## 2020-02-07 ENCOUNTER — TELEPHONE (OUTPATIENT)
Dept: NEUROLOGY | Facility: MEDICAL CENTER | Age: 25
End: 2020-02-07

## 2020-02-07 NOTE — TELEPHONE ENCOUNTER
LVM with pt asking to have labs done as it has been over a year since last blood work for our office. Encouraged to call this office at  for any further questions/concerns.

## 2020-02-07 NOTE — TELEPHONE ENCOUNTER
Per pt/ mom they wanted Onfi, Briviact, Ativan and the multi vit to [facility] [place]. I called spoke with pharmacist Carlos gave verball to fill 4 medications all have additional refills. I did specifically let Carlos know the Onfi has to be filled for brand name only that pt cannot take the generic due to rash. He verbally understood. I called pt's mother and left a detailed message.

## 2020-02-14 ENCOUNTER — TELEPHONE (OUTPATIENT)
Dept: NEUROLOGY | Facility: MEDICAL CENTER | Age: 25
End: 2020-02-14

## 2020-02-14 NOTE — TELEPHONE ENCOUNTER
I lvm for mom stating I received a fax of PA cancellation for the Onfi and I will follow up with [facility] to see what the reason was.

## 2020-02-27 ENCOUNTER — HOSPITAL ENCOUNTER (OUTPATIENT)
Dept: LAB | Facility: MEDICAL CENTER | Age: 25
End: 2020-02-27
Attending: PEDIATRICS
Payer: MEDICARE

## 2020-02-27 DIAGNOSIS — E23.0 HYPOPITUITARISM (HCC): ICD-10-CM

## 2020-02-27 LAB
25(OH)D3 SERPL-MCNC: 95 NG/ML (ref 30–100)
ALBUMIN SERPL BCP-MCNC: 4.4 G/DL (ref 3.2–4.9)
ALBUMIN/GLOB SERPL: 1.2 G/DL
ALP SERPL-CCNC: 65 U/L (ref 30–99)
ALT SERPL-CCNC: 13 U/L (ref 2–50)
ANION GAP SERPL CALC-SCNC: 8 MMOL/L (ref 0–11.9)
AST SERPL-CCNC: 17 U/L (ref 12–45)
BILIRUB SERPL-MCNC: 0.4 MG/DL (ref 0.1–1.5)
BUN SERPL-MCNC: 14 MG/DL (ref 8–22)
CALCIUM SERPL-MCNC: 9 MG/DL (ref 8.5–10.5)
CHLORIDE SERPL-SCNC: 101 MMOL/L (ref 96–112)
CO2 SERPL-SCNC: 28 MMOL/L (ref 20–33)
CREAT SERPL-MCNC: 0.86 MG/DL (ref 0.5–1.4)
GLOBULIN SER CALC-MCNC: 3.6 G/DL (ref 1.9–3.5)
GLUCOSE SERPL-MCNC: 78 MG/DL (ref 65–99)
POTASSIUM SERPL-SCNC: 3.7 MMOL/L (ref 3.6–5.5)
PROT SERPL-MCNC: 8 G/DL (ref 6–8.2)
SODIUM SERPL-SCNC: 137 MMOL/L (ref 135–145)
T4 FREE SERPL-MCNC: 1.13 NG/DL (ref 0.53–1.43)

## 2020-02-27 PROCEDURE — 80053 COMPREHEN METABOLIC PANEL: CPT

## 2020-02-27 PROCEDURE — 82306 VITAMIN D 25 HYDROXY: CPT

## 2020-02-27 PROCEDURE — 84439 ASSAY OF FREE THYROXINE: CPT

## 2020-02-27 PROCEDURE — 84305 ASSAY OF SOMATOMEDIN: CPT

## 2020-02-27 PROCEDURE — 36415 COLL VENOUS BLD VENIPUNCTURE: CPT

## 2020-02-29 LAB
IGF-I SERPL-MCNC: 274 NG/ML (ref 103–326)
IGF-I Z-SCORE SERPL: 1

## 2020-03-03 ENCOUNTER — TELEPHONE (OUTPATIENT)
Dept: NEUROLOGY | Facility: MEDICAL CENTER | Age: 25
End: 2020-03-03

## 2020-03-03 NOTE — TELEPHONE ENCOUNTER
Mom called lvm stating she does not think it was the onfi  giving pt the rash. She stated pt is still having the rash, all over neck and body. Mom is concerned.

## 2020-03-17 ENCOUNTER — APPOINTMENT (OUTPATIENT)
Dept: NEUROLOGY | Facility: MEDICAL CENTER | Age: 25
End: 2020-03-17
Payer: MEDICARE

## 2020-03-19 DIAGNOSIS — E23.0 HYPOPITUITARISM (HCC): ICD-10-CM

## 2020-03-19 DIAGNOSIS — E23.0 PANHYPOPITUITARISM (HCC): ICD-10-CM

## 2020-03-20 RX ORDER — LEVOTHYROXINE SODIUM 0.05 MG/1
50 TABLET ORAL
Qty: 30 TAB | Refills: 0 | Status: SHIPPED | OUTPATIENT
Start: 2020-03-20 | End: 2020-04-14

## 2020-03-20 RX ORDER — HYDROCORTISONE 5 MG/1
5 TABLET ORAL 2 TIMES DAILY
Qty: 70 TAB | Refills: 0 | Status: SHIPPED | OUTPATIENT
Start: 2020-03-20 | End: 2020-04-14

## 2020-03-23 ENCOUNTER — TELEPHONE (OUTPATIENT)
Dept: NEUROLOGY | Facility: MEDICAL CENTER | Age: 25
End: 2020-03-23

## 2020-03-23 NOTE — TELEPHONE ENCOUNTER
I spoke with mom and rash is still there, they have documented each time she has had it. And both agree to telemed with you.

## 2020-03-23 NOTE — TELEPHONE ENCOUNTER
Can u call mom and fu on rash? If gone, ok. Otherwise, do they need to be seen through telemed?   RA     I will wait for mom to return my call.

## 2020-03-24 ENCOUNTER — TELEPHONE (OUTPATIENT)
Dept: NEUROLOGY | Facility: MEDICAL CENTER | Age: 25
End: 2020-03-24

## 2020-03-24 NOTE — TELEPHONE ENCOUNTER
I called [facility] pharmacy spoke with Aylin and gave verbal to refill brand Onfi no refills until Dr Herbert has follow up with pt tomorrow morning to discuss medications. Pt/mom are aware.

## 2020-03-25 ENCOUNTER — TELEMEDICINE2 (OUTPATIENT)
Dept: NEUROLOGY | Facility: MEDICAL CENTER | Age: 25
End: 2020-03-25
Payer: MEDICARE

## 2020-03-25 DIAGNOSIS — T42.75XA ADVERSE EFFECT OF ANTIEPILEPTIC, INITIAL ENCOUNTER: ICD-10-CM

## 2020-03-25 DIAGNOSIS — E23.0 PANHYPOPITUITARISM (HCC): ICD-10-CM

## 2020-03-25 DIAGNOSIS — G40.219 PHARMACORESISTANT PARTIAL EPILEPSY WITH IMPAIRMENT OF CONSCIOUSNESS, INTRACTABLE (HCC): Chronic | ICD-10-CM

## 2020-03-25 DIAGNOSIS — E55.9 VITAMIN D DEFICIENCY: ICD-10-CM

## 2020-03-25 DIAGNOSIS — G93.89 ENCEPHALOMALACIA: ICD-10-CM

## 2020-03-25 DIAGNOSIS — R21 RASH: ICD-10-CM

## 2020-03-25 DIAGNOSIS — C71.9: ICD-10-CM

## 2020-03-25 DIAGNOSIS — Z13.31 SCREENING FOR DEPRESSION: ICD-10-CM

## 2020-03-25 PROCEDURE — 99215 OFFICE O/P EST HI 40 MIN: CPT | Mod: CR | Performed by: PSYCHIATRY & NEUROLOGY

## 2020-03-25 RX ORDER — ZONISAMIDE 100 MG/1
200 CAPSULE ORAL
Qty: 60 CAP | Refills: 11 | Status: SHIPPED | OUTPATIENT
Start: 2020-03-25 | End: 2020-08-27 | Stop reason: SDUPTHER

## 2020-03-25 RX ORDER — CLOBAZAM 10 MG/1
TABLET ORAL
Qty: 105 TAB | Refills: 5 | Status: SHIPPED | OUTPATIENT
Start: 2020-03-25 | End: 2020-07-28

## 2020-03-25 ASSESSMENT — PATIENT HEALTH QUESTIONNAIRE - PHQ9: CLINICAL INTERPRETATION OF PHQ2 SCORE: 0

## 2020-03-25 NOTE — PROGRESS NOTES
Chief complaint: Seizure, rash.    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    Interim history:  Miles Paredes was seen through telemedicine services today, the patient's mother had called the office reporting recurrent episodes of rash, despite the patient switching over the last couple of month to brand-name Onfi.  The rash appears to have a sudden onset, and it can be widespread, but does not involve the mucous membranes.  The mother describes some hives as well, they deny any new foods, dyes, detergents or soaps.  The mother believes that the rash now is related to the Briviact.  It appears that the rash is transient, lasting for about an hour or so, after they applied Benadryl cream.  On February 10, 2020, she had a brief, 1 minute, nonconvulsive seizure, consisting of aphasia, and mild confusion.  She has not had any convulsions.  The mother is requesting to switch Briviact to an alternative medication.  The patient has been on Dilantin, and Vimpat in the past.  Her mood is great, she is not depressed, suicidal or homicidal.  The patient does not drive.  She is not sexually active.  She lives at home with her mother.  The patient denies any other complaints.        Past medical history:   Past Medical History:   Diagnosis  Date   • Cancer (HCC)     PNET tumor   • Febrile seizures (HCC)     until age 6 yr   • H/O stem cell transplant (HCC)    • Hypopituitarism (HCC)    • Poor short term memory    • Primary ovarian failure    • Radiation exposure     craniospinal, and tumor resection   • Right foot drop    • Seizures (HCC)     started in January 2nd to chemo   • Supratentorial PNET (HCC)     ds 6/2009   • Unspecified hemorrhagic conditions     bone marrow transplant       Past surgical history:   Past Surgical History:   Procedure Laterality Date   • PB BONE MARROW/STEM XPLANT,ALLOGENIC  12/22/09   • OTHER NEUROLOGICAL SURG      tumor resection x2, 6/28/09 & 11/20/09   • GA PROTON BEAM DEL,SIMPLE      completed 30 rounds radiation        Family history:   Family History   Family history unknown: Yes       Social history:   Social History     Socioeconomic History   • Marital status: Single     Spouse name: Not on file   • Number of children: Not on file   • Years of education: Not on file   • Highest education level: Not on file   Occupational History   • Not on file   Social Needs   • Financial resource strain: Not on file   • Food insecurity     Worry: Not on file     Inability: Not on file   • Transportation needs     Medical: Not on file     Non-medical: Not on file   Tobacco Use   • Smoking status: Never Smoker   • Smokeless tobacco: Never Used   Substance and Sexual Activity   • Alcohol use: No   • Drug use: No   • Sexual activity: Not on file   Lifestyle   • Physical activity     Days per week: Not on file     Minutes per session: Not on file   • Stress: Not on file   Relationships   • Social connections     Talks on phone: Not on file     Gets together: Not on file     Attends [employer] service: Not on file     Active member of club or organization: Not on file     Attends meetings of clubs or organizations: Not on file     Relationship status: Not on file   • Intimate partner violence     Fear of current or ex partner: Not on  file     Emotionally abused: Not on file     Physically abused: Not on file     Forced sexual activity: Not on file   Other Topics Concern   • Not on file   Social History Narrative    Adopted from [place] @ 13 months    Graduated [employer]  6/2014, back at school [employer] for vocational training    Also volunteers at [facility] lives with mom and 2 younger adopted sisters (not bio)       Current medications:   Current Outpatient Medications   Medication   • ONFI 10 MG Tab tablet   • zonisamide (ZONEGRAN) 100 MG Cap   • hydrocortisone (CORTEF) 5 MG Tab   • levothyroxine (SYNTHROID) 50 MCG Tab   • hydrocortisone sodium succinate PF (SOLU-CORTEF) 100 MG Recon Soln injection   • norethindrone-ethinyl estradiol (JUNEL FE 1/20) 1-20 MG-MCG per tablet   • LORazepam (ATIVAN) 2 MG tablet   • therapeutic multivitamin-minerals (THERAGRAN-M) Tab     No current facility-administered medications for this visit.        Medication Allergy:  Allergies   Allergen Reactions   • Oxcarbazepine Rash   • Lamotrigine          Review of systems:   As reviewed in today's history.    The examination was limited by telemedicine.      Physical examination:   General: Patient in no acute distress, pleasant and cooperative.  HEENT: Normocephalic, no signs of visible trauma.    Skin: no signs of acute rashes or trauma.   Musculoskeletal: joints exhibit full range of motion.   Psychiatric: No hallucinatory behavior. Denies symptoms of depression or suicidal ideation. Mood and affect appear normal on exam.     NEUROLOGICAL EXAM:   Mental status, orientation: Awake, alert and fully oriented.   Speech and language: speech is clear and fluent. The patient is able to name, repeat and comprehend.   Memory: There is intact recollection of recent and remote events.   Cranial nerve exam: Intact full EOM in all directions of gaze. Face appears symmetric. Tongue is midline and without any signs of tongue biting or fasciculations.Sternocleidomastoid muscles exhibit is  normal strength bilaterally. Shoulder shrug is intact bilaterally.   Motor exam: Able to move all extremities without any apparent deficit. No abnormal movements were seen on exam.   Coordination: shows a normal finger-nose-finger. Normal rapidly alternating movements.   Gait: The patient was able to get up from seated position on first attempt without requiring assistance. Found to be steady when walking. Movements were fluid with normal arm swing.         ANCILLARY DATA REVIEWED:       Lab Data Review:  Labs reviewed in chart.  Vitamin D deficiency has been corrected.    Records reviewed:   Chart reviewed.    Imaging:   MRI brain with and without, 12/11/2018:  1.  There is no recurrent lesion.  2.  Stable postsurgical changes.  3.  Multifocal areas of stable encephalomalacia with chronic hemosiderin deposition in the left temporal, parietal and occipital lobes and left thalamus.  4.  Stable Nonspecific T2 hyperintensities in the frontal white matter likely representing nonspecific foci of gliosis.  (I personally reviewed images).     EEG:  Video EEG, 4/14/2010:  CLINICAL INTERPRETATION:  This is abnormal video EEG monitoring for  patient of this age.  The patient has continuous ictal discharges in  the left hemisphere consisting of spike and wave and polyspike and  wave discharges with a frequency of 1 Hz.  There was no response to  propofol and phenobarbital during this recording.  Clinical  correlation is required.     Video EEG/EMU, 8/19/2019:  This is an abnormal 5 days video electroencephalogram recording   in the awake, drowsy and sleep state. There is intermittent   slowing in the left hemisphere with frequent left frontotemporal   sharps noted. There are intermittent runs of up 8 seconds long of   rhythmic theta activity in the frontal regions, most pronounced   on the left. A seizure was captured during the study, reported by   patient as heavy sensation on the right arm, difficulties with   words and  feeling mildly off.  These events was in keeping with a   left hemisphere focal non-motor seizure, arising from the left   posterior quadrant. The findings confirm diagnosis of focal onset   epilepsy and suggest underlying areas of cortical irritability   and structural abnormality. Clinical and radiological correlation   is recommended.           ASSESSMENT AND PLAN:    1. 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 Do all this for 180 days.  Dispense: 105 Tab; Refill: 5  - CBC WITH DIFFERENTIAL; Future  - Comp Metabolic Panel; Future  - ZONISAMIDE (ZONEGRAN); Future    2. Panhypopituitarism (HCC)      3. Adverse effect of antiepileptic, initial encounter      4. Screening for depression      5. Vitamin D deficiency      6. Encephalomalacia      7. 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  History of left temporal PNET, on remission, followed by yearly MRIs.  Last MRI showed no recurrent lesions (12/11/2018).  Status post resection, chemo and radiation.  Large area of encephalomalacia likely dysfunctional tissue and suspected epileptogenic focus to account for her focal onset seizures.  Had not had a seizure in about 2 years, but with sleep deprivation and holding phenytoin had a focal left hemisphere seizure during the last EMU admission in August 2019 (seizure consisting of right arm heaviness, mild difficulties with words, mild confusion).  This meant she will require more than one antiepileptic for seizure control.  The patient had been on Dilantin 300 mg nightly at home, she was complaining of toxicity, fluctuating levels, and they wanted to come off the Dilantin.  The patient apparently also had some sort of side effect to Vimpat in the past, as well as Keppra.  The patient and the  mother ultimately decided a combination of Briviact 100 mg twice a day, and clobazam 15 mg in the morning and 20 mg at bedtime.  She had been doing great on this regimen, without any side effects, except for a new onset of a rash since the last week of November, which apparently had coincided with a  change from brand name to a generic Onfi, however despite retuning to Onfi brand a few months ago, she still having intermittent rash all over the body. Rash not currently present and resolves within one hr with benadryl cream, not involving mucoses. Mother would like to come off Briviact and add another drug. We discussed possible options, and out of the options provided, they chose to start zonegran. She will start with 100 mg qhs for one week, then increase to 200 mg po qhs. She may need higher doses for seizure control. After two weeks on zonegran, she will start weaning Briviact to 50 mg bid for one week, then 50 mg qhs for one week and then d/c. Gave option to stop Briviact right away but discussed risk for seizure worsening while we titrate zonegran, mother afraid and agreed with taper as discussed.     The pt and mother aware of risk for seizures when adjusting seizure meds. Side effects of zonegran d/w pt and mother at length, no h/o kidney stones, discussed all possible side effects.      The patient continues to refuse to take folic acid.  She was on folic acid for supplementation while on Dilantin.  The mother requested a recheck level of the folic acid, which was normal.      The patient will undergo blood work before the next appointment.     The patient and the mother indicate that the patient has never been and will never be sexually active.  They are refusing folic acid and any possible contraception.     The patient does not drive, and will remain as such.      The patient's mother will call us if the rash continues and is aware that if it worsens in the future, they should go to the emergency  department.     Chronic right sided hypoesthesia and right homonymous hemianopia are postsurgical.     H/o vit D deficiency, resolved. Instructed to stop weekly supplement.     Patient and mother agree with the plan, as outlined.           FOLLOW-UP:   Return in about 3 months (around 2020).      EDUCATION AND COUNSELING:  -Education was provided to the patient and/or family regarding diagnosis and prognosis. The chronic and unpredictable nature of the condition were discussed. There is increased risk for additional events, which may carry potential for significant injuries and death. Discussed frequent seizure triggers: sleep deprivation, medication non-compliance, use of illegal drugs/alcohol, stress, and others.   -We reviewed in detail the current antiepileptic regimen. Potential side effects of antiepileptics were discussed at length, including but no limited to: hypersensitivity reactions (rash and others, some of which can be fatal), visual field changes (some of which may be irreversible), glaucoma, diplopia, kidney stones, osteopenia/osteoporosis/bone fractures, hyperthermia/anhydrosis, hyponatremia, tremors/abnormal movements, ataxia, dizziness, fatigue, increased risk for falls, risk for cardiac arrhythmias/syncope, gastrointestinal side effects(hepatitis, pancreatitis, gastritis, ulcers), gingival hypertrophy/bleeding, drowsiness, sedation, anxiety/nervousness, increased risk for suicide, increased risk for depression, and psychosis.   -We also reviewed drug-drug interactions and their potential effect on seizure control and medication side effects.    -We also discussed in detail potential effects of seizures, epilepsy, and medications during pregnancy, including but not limited to fetal malformations, child developmental/intellectual disability, fetal/ risk for hemorrhages, stillbirth, maternal death, premature birth, and others. The patient/family aware that pregnancy should be avoided,  unless desired, in which case we recommend discussing with us at least a year prior to planned conception. To avoid undesired pregnancy while on antiepileptics, we recommend dual contraception.   -Folic acid 2 mg is recommended for all females in childbearing age (12-44 years of age).  The patient is not sexually active, they do not see the need for contraception or folic acid.  -Recommend chronic vitamin D supplementation and regular exercise (if not contraindicated).   -Patient/family educated on risk for SUDEP (Sudden Death in Epilepsy). Counseling was provided on the importance of strict medication and follow up compliance. The patient/family understand the risks associated with non-adherence with the medical plan as outlined, including but not limited to an increased risk for breakthrough seizures, which may contribute to injuries, disability, status epilepticus, and even death.   -Avoid sleep deprivation.   -We extensively discussed the aspects related to safety in drivers who suffer from epilepsy. The patient is encourage to report to the Division of Motor Vehicles of any condition and/or spells related to confusion, disorientation, and/or loss of awareness and/or loss of consciousness; as these may pose a safety issue if they occur while operating a motor vehicle. The patient and/or family are ultimately responsible for exercising caution and abiding to regulations in place.  The patient does not drive.  -Other seizure precautions were discussed at length, including no diving, no skydiving, no climbing or exposure to unprotected heights, no unsupervised swimming, no Jacuzzi or bathing in bathtubs or deep bodies of water. The patient/family have been advised about risks for operating any machinery while suffering from seizures / syncope / epilepsy and/or while taking antiepileptic drugs.   -The patient understands and agrees that due to the complexity of his/her diagnosis, results of any testing and further  recommendations will typically be discussed/made during a face to face encounter in my office. The patient and/or family further understands it is their responsibility to keep proper follow up.     Patient/family agree with plan, as outlined.         Alberto Herbert MD   Epilepsy and Neurodiagnostics.   Clinical  of Neurology [facility].   Diplomate in Neurology, Epilepsy, and Electrodiagnostic Medicine.   Office: [phone]  Fax: [phone]      Patient was presented for a telehealth consultation via secure and encrypted videoconferencing technology.     BILLING DOCUMENTATION:     I have performed physical exam and reviewed and updated ROS and plan today 3/25/2020. In review of that note, there are no new changes except as documented above.     Counseling:  I spent greater than 50% time face-to-face (via telemedicine) time of a total of 51 minutes visit. Over 50% of the time of the visit today was spent on counseling and or coordination of care wtih the patient and/or family, with greater than 50% of the total discussing my assessment and plan as stated above.

## 2020-04-13 DIAGNOSIS — E23.0 HYPOPITUITARISM (HCC): ICD-10-CM

## 2020-04-13 DIAGNOSIS — E23.0 PANHYPOPITUITARISM (HCC): ICD-10-CM

## 2020-04-13 NOTE — TELEPHONE ENCOUNTER
Received request via: Pharmacy    Was the patient seen in the last year in this department? Yes    Does the patient have an active prescription (recently filled or refills available) for medication(s) requested? No

## 2020-04-14 RX ORDER — HYDROCORTISONE 5 MG/1
TABLET ORAL
Qty: 70 TAB | Refills: 0 | Status: SHIPPED | OUTPATIENT
Start: 2020-04-14 | End: 2020-06-24

## 2020-04-14 RX ORDER — LEVOTHYROXINE SODIUM 0.05 MG/1
50 TABLET ORAL
Qty: 30 TAB | Refills: 0 | Status: SHIPPED | OUTPATIENT
Start: 2020-04-14 | End: 2020-05-11

## 2020-05-08 DIAGNOSIS — E23.0 HYPOPITUITARISM (HCC): ICD-10-CM

## 2020-05-11 RX ORDER — LEVOTHYROXINE SODIUM 0.05 MG/1
50 TABLET ORAL
Qty: 30 TAB | Refills: 0 | Status: SHIPPED | OUTPATIENT
Start: 2020-05-11 | End: 2020-06-24

## 2020-06-16 ENCOUNTER — OFFICE VISIT (OUTPATIENT)
Dept: PEDIATRIC ENDOCRINOLOGY | Facility: MEDICAL CENTER | Age: 25
End: 2020-06-16
Payer: MEDICARE

## 2020-06-16 VITALS
HEART RATE: 84 BPM | BODY MASS INDEX: 23.53 KG/M2 | DIASTOLIC BLOOD PRESSURE: 64 MMHG | HEIGHT: 61 IN | SYSTOLIC BLOOD PRESSURE: 98 MMHG | WEIGHT: 124.6 LBS

## 2020-06-16 DIAGNOSIS — E23.0 HYPOPITUITARISM (HCC): ICD-10-CM

## 2020-06-16 DIAGNOSIS — R62.50 UNSPECIFIED LACK OF EXPECTED NORMAL PHYSIOLOGICAL DEVELOPMENT IN CHILDHOOD: ICD-10-CM

## 2020-06-16 DIAGNOSIS — G40.909 SEIZURE DISORDER (HCC): ICD-10-CM

## 2020-06-16 DIAGNOSIS — R94.7 ABNORMAL RESULTS OF OTHER ENDOCRINE FUNCTION STUDIES: ICD-10-CM

## 2020-06-16 DIAGNOSIS — R62.0 DELAYED MILESTONE IN CHILDHOOD: ICD-10-CM

## 2020-06-16 DIAGNOSIS — C71.8 MALIGNANT NEOPLASM OF OVERLAPPING SITES OF BRAIN (HCC): ICD-10-CM

## 2020-06-16 PROCEDURE — 99214 OFFICE O/P EST MOD 30 MIN: CPT | Performed by: PEDIATRICS

## 2020-06-16 RX ORDER — LORAZEPAM 0.5 MG/1
0.5 TABLET ORAL EVERY 8 HOURS PRN
COMMUNITY
End: 2021-02-03 | Stop reason: SDUPTHER

## 2020-06-16 ASSESSMENT — FIBROSIS 4 INDEX: FIB4 SCORE: 0.52

## 2020-06-16 NOTE — PROGRESS NOTES
Subjective:     Chief Complaint   Patient presents with   • Follow-Up   • Hypopituitarism       Past endocrine history:  Miles Paredes is a 24 y.o. 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  She underwent a partial resection of the tumor followed by chemotherapy, cranial spinal irradiation (unknown dose) and ultimately an autologous stem cell transplant.  Subsequent to that, she has had issues with significant seizure activity as well as hypopituitarism.  Additionally, she had primary ovarian failure as well.     Currently, she has TSH as well as ACTH deficiencies but primary ovarian failure.  In the past as well as most recently, her growth and levels have been normal.  She has never been on growth and on in the past.  She currently is on levothyroxine, hydrocortisone and alternating doses of Premarin and Provera separately.  Many many years ago she was on a birth control pill but had significant breakthrough bleeding.  However in the visit today the family did not recall that.  We did talk about trying to switch her back to birth control pills just for ease of use as well as improvement in physiology with the goal of trying to be as close to normal physiology as possible.     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  Now they state that she has not had a seizure in about a year.  However, she did have a very significant seizure which was grand mal in nature a few years ago.     History of present illness:    Since her last visit here, she has overall been doing well.  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  However, she is found things to do at home including some housekeeping chores, taking walks, taking care of her dog, etc.  Overall she seems very happy.  She does state that she had a new neurologist recently who put her in the  hospital for a few days to do a sleep deprived EEG to see if she he could induce any seizures.  They state that it took about 3 days but ultimately she did have a seizure.  They were doing this primarily to see if she still needed any medications ultimately, she has been taken off the Dilantin as there were concerns about her being on that too long.  At this point, they do not really notice a big difference in terms of her memory although it is quite remarkable to me the difference in her in terms of her ability to communicate, talk to me about her medications, how she is feeling, etc.  At this point, she is completely in charge of her own medications including filling up her pillboxes and taking these on her own.  Mom does relate that the refill frequency does seem to be very appropriate given how they are prescribed for her.      In terms of her menses, she states that normally she only has some vaginal bleeding on the days that she is on the last line of the pills i.e. the placebo however this past month she did have some extending into the start of the new pack.  I assured her that this is normal and sometimes will happen.  At this point, she does not have any heavy bleeding, any other irregular times of bleeding other than noted above and no significant cramping.    We did review tripling her hydrocortisone dose in times of stress with a temperature greater than 100.5 or an illness greater than just a simple URI.  This should continue to happen until her symptoms are mostly resolved and at a minimum the fever is gone.  If her symptoms include vomiting or eats a severe illness or trauma such as a broken bone, car accident, surgery, etc. then she would need the Solu-Cortef dosing which we also went over.  She does have an unexpired vial of Solu-Cortef with her at all times including a syringe.  We did take it out of the box and I showed her again how to utilize this.        Now that she is almost 25 years old and  is on Medicare now we will switch her over to adult endocrinology and a referral was made to an endocrinologist in [place] for ease of use since they do live in [place].  Social history: The patient lives with her mom as well as siblings.  She does work in a  about 3 hours a day.          Hospital Outpatient Visit on 02/27/2020   Component Date Value Ref Range Status   • IGF1 02/27/2020 274  103 - 326 ng/mL Final   • IGF-1 Z Score Calculation 02/27/2020 1.0   Final    Comment: INTERPRETIVE INFORMATION: IGF 1 Z-SCORE CALCULATION  A Z score is the number of standard deviations a given result is  above (positive score) or below (negative score) the age- and  sex-adjusted population mean.  Results that are within the IGF-1  reference interval will have a Z score between -2.0 and +2.0.  Performed by [facility],  [address] [place] [id number] [phone]  [email or web address], Connor Hall MD, Lab. Director     • 25-Hydroxy   Vitamin D 25 02/27/2020 95  30 - 100 ng/mL Final    Comment: Adult Ranges:   <20 ng/mL - Deficiency  20-29 ng/mL - Insufficiency   ng/mL - Sufficiency  The [employer] Vitamin D Assay is standardized to the  [employer] reference measurement procedures, a  reference method for the Vitamin D Standardization Program  (VDSP).  The VDSP aligns patient results among 25 (OH)  Vitamin D methods.     • Free T-4 02/27/2020 1.13  0.53 - 1.43 ng/dL Final   • Sodium 02/27/2020 137  135 - 145 mmol/L Final   • Potassium 02/27/2020 3.7  3.6 - 5.5 mmol/L Final   • Chloride 02/27/2020 101  96 - 112 mmol/L Final   • Co2 02/27/2020 28  20 - 33 mmol/L Final   • Anion Gap 02/27/2020 8.0  0.0 - 11.9 Final   • Glucose 02/27/2020 78  65 - 99 mg/dL Final   • Bun 02/27/2020 14  8 - 22 mg/dL Final   • Creatinine 02/27/2020 0.86  0.50 - 1.40 mg/dL Final   • Calcium 02/27/2020 9.0  8.5 - 10.5 mg/dL Final   • AST(SGOT) 02/27/2020 17  12 - 45 U/L Final   • ALT(SGPT) 02/27/2020 13  2 - 50 U/L Final   •  Alkaline Phosphatase 02/27/2020 65  30 - 99 U/L Final   • Total Bilirubin 02/27/2020 0.4  0.1 - 1.5 mg/dL Final   • Albumin 02/27/2020 4.4  3.2 - 4.9 g/dL Final   • Total Protein 02/27/2020 8.0  6.0 - 8.2 g/dL Final   • Globulin 02/27/2020 3.6* 1.9 - 3.5 g/dL Final   • A-G Ratio 02/27/2020 1.2  g/dL Final   • GFR If  02/27/2020 >60  >60 mL/min/1.73 m 2 Final   • GFR If Non  02/27/2020 >60  >60 mL/min/1.73 m 2 Final       ROS  Constitutional: Negative for fever, chills, weight loss, malaise/fatigue and diaphoresis.   HENT: Negative for congestion, ear discharge, ear pain, hearing loss, nosebleeds, sore throat and tinnitus.   Eyes: Negative for blurred vision, double vision, photophobia, pain, discharge and redness.   Respiratory: Negative for cough, hemoptysis, sputum production, shortness of breath, wheezing and stridor.    Cardiovascular: Negative for chest pain, palpitations, orthopnea, claudication, leg swelling and PND.   Gastrointestinal: Negative for heartburn, nausea, vomiting, abdominal pain, diarrhea, constipation, blood in stool and melena.   Genitourinary: Negative for dysuria, urgency, frequency, hematuria and flank pain.  Musculoskeletal: Negative for myalgias, back pain, joint pain, falls and neck pain.   Skin: Negative for itching and rash.   Neurological: Negative for dizziness, tingling, tremors, sensory change, speech change, focal weakness, seizures, loss of consciousness, weakness and headaches.   Endo/Heme/Allergies: Negative for environmental allergies and polydipsia. Does not bruise/bleed easily.   Psychiatric/Behavioral: Negative for depression, suicidal ideas, hallucinations, memory loss and substance abuse. The patient is not nervous/anxious and does not have insomnia.     Allergies   Allergen Reactions   • Oxcarbazepine Rash   • Lamotrigine        Current Outpatient Medications   Medication Sig Dispense Refill   • LORazepam (ATIVAN) 0.5 MG Tab Take 0.5 mg by  mouth every 8 hours as needed (seizure).     • levothyroxine (SYNTHROID) 50 MCG Tab TAKE 1 TAB BY MOUTH EVERY MORNING ON AN EMPTY STOMACH. 30 Tab 0   • hydrocortisone (CORTEF) 5 MG Tab TAKE ONE TABLET BY MOUTH TWICE DAILY AND TRIPLE DOSE FOR ILLNESS OR OTHER STRESS 70 Tab 0   • ONFI 10 MG Tab tablet Take 1.5 Tabs by mouth every day AND 2 Tabs every bedtime. Do all this for 180 days. 105 Tab 5   • zonisamide (ZONEGRAN) 100 MG Cap Take 2 Caps by mouth every bedtime. 60 Cap 11   • hydrocortisone sodium succinate PF (SOLU-CORTEF) 100 MG Recon Soln injection 2 mL by Intravenous route Once PRN. 2 mL 1   • norethindrone-ethinyl estradiol (JUNEL FE 1/20) 1-20 MG-MCG per tablet Take 1 Tab by mouth every day. 28 Tab 6   • therapeutic multivitamin-minerals (THERAGRAN-M) Tab Take 1 Tab by mouth every day. 30 Tab 11     No current facility-administered medications for this visit.        Social History     Socioeconomic History   • Marital status: Single     Spouse name: Not on file   • Number of children: Not on file   • Years of education: Not on file   • Highest education level: Not on file   Occupational History   • Not on file   Social Needs   • Financial resource strain: Not on file   • Food insecurity     Worry: Not on file     Inability: Not on file   • Transportation needs     Medical: Not on file     Non-medical: Not on file   Tobacco Use   • Smoking status: Never Smoker   • Smokeless tobacco: Never Used   Substance and Sexual Activity   • Alcohol use: No   • Drug use: No   • Sexual activity: Not on file   Lifestyle   • Physical activity     Days per week: Not on file     Minutes per session: Not on file   • Stress: Not on file   Relationships   • Social connections     Talks on phone: Not on file     Gets together: Not on file     Attends Scientologist service: Not on file     Active member of club or organization: Not on file     Attends meetings of clubs or organizations: Not on file     Relationship status: Not on file  "  • Intimate partner violence     Fear of current or ex partner: Not on file     Emotionally abused: Not on file     Physically abused: Not on file     Forced sexual activity: Not on file   Other Topics Concern   • Not on file   Social History Narrative    Adopted from [place] @ 13 months    Graduated [employer] 6/2014, back at school [employer] for vocational training    Also volunteers at [employer] lives with mom and 2 younger adopted sisters (not bio)          Objective:   BP (!) 98/64 (BP Location: Left arm, Patient Position: Sitting, BP Cuff Size: Adult)   Pulse 84   Ht 1.548 m (5' 0.95\")   Wt 56.5 kg (124 lb 9.6 oz)     Physical Exam   Constitutional: she is oriented to person, place, and time. she appears well-developed and well-nourished.  Mildly developmentally delayed  Skin: Skin is warm and dry.   Head: Normocephalic and atraumatic.    Eyes: Pupils are equal, round, and reactive to light. No scleral icterus.  Mouth/Throat: Tongue normal. Oropharynx is clear and moist. Posterior pharynx without erythema or exudates.  Neck: Supple, trachea midline. No thyromegaly present.   Cardiovascular: Normal rate and regular rhythm.  Chest: Effort normal. Clear to auscultation throughout. No adventitious sounds.  Abdominal: Soft, non tender, and without distention. Active bowel sounds in all four quadrants. No rebound, guarding, masses or hepatosplenomegaly.  Extremities: No cyanosis, clubbing, erythema, nor edema.   Neurological: she is alert and oriented to person, place, and time. she has normal reflexes.   : Micha 4  Psychiatric: she has a normal mood and affect. her behavior is normal.       Assessment and Plan:   The following treatment plan was discussed:     1. Hypopituitarism (HCC)    - REFERRAL TO ENDOCRINOLOGY    2. Unspecified lack of expected normal physiological development in childhood    3. Malignant neoplasm of overlapping sites of brain (HCC)    4. Abnormal results of other endocrine function studies    5. " Seizure disorder (HCC)    6. Delayed milestone in childhood    She has ACTH, TSH and LH deficiencies.  At this time she is not showing any evidence of growth hormone deficiency or diabetes insipidus.  We will continue her on her current dose of medications at this point.  Her most recent labs were done in February and showed that she was on appropriate dose of levothyroxine.  Based on her body surface area her hydrocortisone dose is reasonable as well.  A letter was written today to allow her to carry syringes and medications on board for airplanes as she does anticipate traveling this summer.  Otherwise, I will not transfer her care to an adult endocrinologist in [place] and a referral was made as such.  In the future, constant surveillance for diabetes insipidus as well as adult growth, deficiency is indicated and replacement as necessary.    Stress Steroid Coverage  Your child’s body does not produce enough of certain hormones that help with stress placed on her body.  Therefore, she should be given extra hydrocortisone in times of stress.  If she has a fever >100.5, an illness that is making her sicker than a simple cold or is vomiting or has diarrhea, administer three times her usual dose of hydrocortisone by mouth.  Give her triple her usual dose until the fever resolves and you feel that she is improving.    If she vomits the dose, wait 30 minutes and give it to her again.  If she still vomits the dose give her an injection of SoluCortef as discussed in the office.  It is extremely important that she get the extra hydrocortisone dose when she is ill.  If she requires surgery, admission to the hospital, breaks a bone or is vomiting she must receive SoluCortef - a high dose of hydrocortisone that is in an injection form.  SoluCortef comes in a vial as a powder which you must mix with sterile water and give as an intramuscular injection.  If she is hospitalized, the dose will be given through an intravenous  catheter.              Follow-Up: Return if symptoms worsen or fail to improve.

## 2020-06-16 NOTE — LETTER
June 16, 2020        Miles Paredes    For medical reasons, Miles must carry medications and syringes with her at all times.          Ai Moore M.D.

## 2020-06-18 DIAGNOSIS — E23.0 PANHYPOPITUITARISM (HCC): ICD-10-CM

## 2020-06-24 DIAGNOSIS — E23.0 HYPOPITUITARISM (HCC): ICD-10-CM

## 2020-06-24 RX ORDER — HYDROCORTISONE 5 MG/1
TABLET ORAL
Qty: 70 TAB | Refills: 0 | Status: SHIPPED | OUTPATIENT
Start: 2020-06-24 | End: 2021-08-04

## 2020-06-24 RX ORDER — LEVOTHYROXINE SODIUM 0.05 MG/1
TABLET ORAL
Qty: 30 TAB | Refills: 2 | Status: SHIPPED | OUTPATIENT
Start: 2020-06-24 | End: 2020-08-18

## 2020-07-27 DIAGNOSIS — G40.219 PHARMACORESISTANT PARTIAL EPILEPSY WITH IMPAIRMENT OF CONSCIOUSNESS, INTRACTABLE (HCC): Chronic | ICD-10-CM

## 2020-07-28 RX ORDER — CLOBAZAM 10 MG/1
TABLET ORAL
Qty: 105 TAB | Refills: 5 | Status: SHIPPED | OUTPATIENT
Start: 2020-07-28 | End: 2020-08-27 | Stop reason: SDUPTHER

## 2020-07-29 DIAGNOSIS — G40.219 PHARMACORESISTANT PARTIAL EPILEPSY WITH IMPAIRMENT OF CONSCIOUSNESS, INTRACTABLE (HCC): Chronic | ICD-10-CM

## 2020-07-29 RX ORDER — CLOBAZAM 10 MG/1
TABLET ORAL
Qty: 105 TAB | Refills: 4 | OUTPATIENT
Start: 2020-07-29 | End: 2021-01-25

## 2020-07-29 NOTE — TELEPHONE ENCOUNTER
I called the [facility] pharmacy and called in ONFI 10 MG: TAKE 1.5 TABLETS BY MOUTH IN THE MORNING AND 2 TABLETS IN THE EVENING. After the pharmacist heron said they didn't receive the script from yesterday.

## 2020-08-04 DIAGNOSIS — E23.0 HYPOPITUITARISM (HCC): ICD-10-CM

## 2020-08-04 RX ORDER — NORETHINDRONE ACETATE AND ETHINYL ESTRADIOL 1MG-20(21)
KIT ORAL
Qty: 28 TAB | Refills: 6 | Status: SHIPPED | OUTPATIENT
Start: 2020-08-04

## 2020-08-18 DIAGNOSIS — E23.0 HYPOPITUITARISM (HCC): ICD-10-CM

## 2020-08-18 RX ORDER — LEVOTHYROXINE SODIUM 0.05 MG/1
TABLET ORAL
Qty: 30 TAB | Refills: 2 | Status: SHIPPED | OUTPATIENT
Start: 2020-08-18

## 2020-08-27 ENCOUNTER — OFFICE VISIT (OUTPATIENT)
Dept: NEUROLOGY | Facility: MEDICAL CENTER | Age: 25
End: 2020-08-27
Payer: MEDICARE

## 2020-08-27 VITALS
HEIGHT: 61 IN | HEART RATE: 76 BPM | WEIGHT: 125.66 LBS | SYSTOLIC BLOOD PRESSURE: 96 MMHG | DIASTOLIC BLOOD PRESSURE: 68 MMHG | BODY MASS INDEX: 23.73 KG/M2 | TEMPERATURE: 98.4 F | OXYGEN SATURATION: 97 %

## 2020-08-27 DIAGNOSIS — T42.75XD: ICD-10-CM

## 2020-08-27 DIAGNOSIS — E55.9 VITAMIN D DEFICIENCY: ICD-10-CM

## 2020-08-27 DIAGNOSIS — G93.89 ENCEPHALOMALACIA: ICD-10-CM

## 2020-08-27 DIAGNOSIS — G40.219 PHARMACORESISTANT PARTIAL EPILEPSY WITH IMPAIRMENT OF CONSCIOUSNESS, INTRACTABLE (HCC): ICD-10-CM

## 2020-08-27 DIAGNOSIS — T42.75XA ADVERSE EFFECT OF ANTIEPILEPTIC, INITIAL ENCOUNTER: ICD-10-CM

## 2020-08-27 DIAGNOSIS — G40.019 LOCALIZATION-RELATED (FOCAL) (PARTIAL) IDIOPATHIC EPILEPSY AND EPILEPTIC SYNDROMES WITH SEIZURES OF LOCALIZED ONSET, INTRACTABLE, WITHOUT STATUS EPILEPTICUS (HCC): ICD-10-CM

## 2020-08-27 DIAGNOSIS — C71.8 MALIGNANT NEOPLASM OF OVERLAPPING SITES OF BRAIN (HCC): ICD-10-CM

## 2020-08-27 DIAGNOSIS — E23.0 PANHYPOPITUITARISM (HCC): ICD-10-CM

## 2020-08-27 DIAGNOSIS — C71.9: ICD-10-CM

## 2020-08-27 DIAGNOSIS — Z13.31 SCREENING FOR DEPRESSION: ICD-10-CM

## 2020-08-27 PROCEDURE — 99215 OFFICE O/P EST HI 40 MIN: CPT | Performed by: PSYCHIATRY & NEUROLOGY

## 2020-08-27 RX ORDER — ZONISAMIDE 100 MG/1
200 CAPSULE ORAL
Qty: 60 CAP | Refills: 11 | Status: SHIPPED | OUTPATIENT
Start: 2020-08-27 | End: 2021-02-03 | Stop reason: SDUPTHER

## 2020-08-27 RX ORDER — CLOBAZAM 10 MG/1
TABLET ORAL
Qty: 105 TAB | Refills: 5 | Status: SHIPPED | OUTPATIENT
Start: 2020-08-27 | End: 2021-02-03 | Stop reason: SDUPTHER

## 2020-08-27 ASSESSMENT — FIBROSIS 4 INDEX: FIB4 SCORE: 0.55

## 2020-08-28 NOTE — PROGRESS NOTES
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, intractable (HCC) (Chronic)    Relevant Medications    ONFI 10 MG Tab tablet    zonisamide (ZONEGRAN) 100 MG Cap    Other Relevant Orders    CBC WITH DIFFERENTIAL    Comp Metabolic Panel    VITAMIN D,25 HYDROXY    ZONISAMIDE (ZONEGRAN)    Malignant neoplasm of overlapping sites of brain (HCC)      Other Visit Diagnoses     Vitamin D deficiency        Relevant Orders    CBC WITH DIFFERENTIAL    Comp Metabolic Panel    VITAMIN D,25 HYDROXY    ZONISAMIDE (ZONEGRAN)    Panhypopituitarism (HCC)        Adverse effect of antiepileptic, initial encounter        Encephalomalacia        Adverse effect of anticonvulsants, subsequent encounter        Localization-related (focal) (partial) idiopathic epilepsy and epileptic syndromes with seizures of localized onset, intractable, without status epilepticus (HCC)        Relevant Medications    ONFI 10 MG Tab tablet    zonisamide (ZONEGRAN) 100 MG Cap    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  The patient is doing very well on a new combination of Onfi and Zonisamide.  She previously reported a rash with Briviact, which was weaned off.  The patient remains seizure-free, and no side effects.  The patient and the mother are quite happy with the outcome.  Her mood is good, she is not depressed or suicidal.  She does not drive.  She is not sexually active, and they indicate that she will never be.  The patient is compliant with her medications.  The patient denies any headaches or new neurological symptoms.    The patient forgot to have blood work done.    Past medical history:   Past Medical History:   Diagnosis Date   •  Cancer (HCC)     PNET tumor   • Febrile seizures (HCC)     until age 6 yr   • H/O stem cell transplant (HCC)    • Hypopituitarism (HCC)    • Poor short term memory    • Primary ovarian failure    • Radiation exposure     craniospinal, and tumor resection   • Right foot drop    • Seizures (HCC)     started in January 2nd to chemo   • Supratentorial PNET (HCC)     ds 6/2009   • Unspecified hemorrhagic conditions     bone marrow transplant       Past surgical history:   Past Surgical History:   Procedure Laterality Date   • PB BONE MARROW/STEM XPLANT,ALLOGENIC  12/22/09   • OTHER NEUROLOGICAL SURG      tumor resection x2, 6/28/09 & 11/20/09   • LA PROTON BEAM DEL,SIMPLE      completed 30 rounds radiation        Family history:   Family History   Family history unknown: Yes       Social history:   Social History     Socioeconomic History   • Marital status: Single     Spouse name: Not on file   • Number of children: Not on file   • Years of education: Not on file   • Highest education level: Not on file   Occupational History   • Not on file   Social Needs   • Financial resource strain: Not on file   • Food insecurity     Worry: Not on file     Inability: Not on file   • Transportation needs     Medical: Not on file     Non-medical: Not on file   Tobacco Use   • Smoking status: Never Smoker   • Smokeless tobacco: Never Used   Substance and Sexual Activity   • Alcohol use: No   • Drug use: No   • Sexual activity: Not on file   Lifestyle   • Physical activity     Days per week: Not on file     Minutes per session: Not on file   • Stress: Not on file   Relationships   • Social connections     Talks on phone: Not on file     Gets together: Not on file     Attends Baptism service: Not on file     Active member of club or organization: Not on file     Attends meetings of clubs or organizations: Not on file     Relationship status: Not on file   • Intimate partner violence     Fear of current or ex partner: Not on file      "Emotionally abused: Not on file     Physically abused: Not on file     Forced sexual activity: Not on file   Other Topics Concern   • Not on file   Social History Narrative    Adopted from [place] @ 13 months    Graduated [employer] 6/2014, back at school [employer] for vocational training    Also volunteers at [facility] lives with mom and 2 younger adopted sisters (not bio)       Current medications:   Current Outpatient Medications   Medication   • ONFI 10 MG Tab tablet   • zonisamide (ZONEGRAN) 100 MG Cap   • levothyroxine (SYNTHROID) 50 MCG Tab   • BLISOVI FE 1/20 1-20 MG-MCG per tablet   • hydrocortisone (CORTEF) 5 MG Tab   • LORazepam (ATIVAN) 0.5 MG Tab   • hydrocortisone sodium succinate PF (SOLU-CORTEF) 100 MG Recon Soln injection   • therapeutic multivitamin-minerals (THERAGRAN-M) Tab     No current facility-administered medications for this visit.        Medication Allergy:  Allergies   Allergen Reactions   • Oxcarbazepine Rash   • Lamotrigine          Review of systems:   As discussed in today's history.  Physical examination:   Vitals:    08/27/20 1003   BP: (!) 96/68   BP Location: Left arm   Patient Position: Sitting   BP Cuff Size: Adult   Pulse: 76   Temp: 36.9 °C (98.4 °F)   TempSrc: Temporal   SpO2: 97%   Weight: 57 kg (125 lb 10.6 oz)   Height: 1.549 m (5' 1\")     General: Patient in no acute distress, pleasant and cooperative.  HEENT: Normocephalic, no signs of acute trauma.   Neck: supple, no meningeal signs or carotid bruits. There is normal range of motion. No tenderness on exam.   Chest: clear to auscultation. No cough.   CV: RRR, no murmurs.   Skin: no signs of acute rashes or trauma.   Musculoskeletal: joints exhibit full range of motion, without any pain to palpation. There are no signs of joint or muscle swelling. There is no tenderness to deep palpation of muscles.   Psychiatric: No hallucinatory behavior. Denies symptoms of depression or suicidal ideation. Mood and affect appear normal on exam. "     NEUROLOGICAL EXAM:   Mental status, orientation: Awake, alert and fully oriented.   Speech and language: speech is clear and fluent. The patient is able to name, repeat and comprehend.   Memory: There is intact recollection of recent and remote events.   Cranial nerve exam: Pupils are 3-4 mm bilaterally and equally reactive to light and accommodation. Visual fields are intact by confrontation. There is no nystagmus on primary or secondary gaze. Intact full EOM in all directions of gaze. Face appears symmetric. Sensation in the face is intact to light touch. Uvula is midline. Palate elevates symmetrically. Tongue is midline and without any signs of tongue biting or fasciculations.Sternocleidomastoid muscles exhibit is normal strength bilaterally. Shoulder shrug is intact bilaterally.   Motor exam: Strength is 5/5 in all extremities. Tone is normal. No abnormal movements were seen on exam.   Sensory exam reveals normal sense of light touch in all extremities.   Deep tendon reflexes:  2+ throughout. Plantar responses are flexor. There is no clonus.   Coordination: shows a normal finger-nose-finger. Normal rapidly alternating movements.   Gait: The patient was able to get up from seated position on first attempt without requiring assistance. Found to be steady when walking. Movements were fluid with normal arm swing. The patient was able to turn without difficulties or tendency to fall. Romberg exam unremarkable.        ANCILLARY DATA REVIEWED:       Lab Data Review:  Reviewed in chart.    Records reviewed:   Chart reviewed.    Imaging:   MRI brain with and without, 12/11/2018:  1.  There is no recurrent lesion.  2.  Stable postsurgical changes.  3.  Multifocal areas of stable encephalomalacia with chronic hemosiderin deposition in the left temporal, parietal and occipital lobes and left thalamus.  4.  Stable Nonspecific T2 hyperintensities in the frontal white matter likely representing nonspecific foci of  gliosis.  (I personally reviewed images).     EEG:  Video EEG, 4/14/2010:  CLINICAL INTERPRETATION:  This is abnormal video EEG monitoring for  patient of this age.  The patient has continuous ictal discharges in  the left hemisphere consisting of spike and wave and polyspike and  wave discharges with a frequency of 1 Hz.  There was no response to  propofol and phenobarbital during this recording.  Clinical  correlation is required.     Video EEG/EMU, 8/19/2019:  This is an abnormal 5 days video electroencephalogram recording   in the awake, drowsy and sleep state. There is intermittent   slowing in the left hemisphere with frequent left frontotemporal   sharps noted. There are intermittent runs of up 8 seconds long of   rhythmic theta activity in the frontal regions, most pronounced   on the left. A seizure was captured during the study, reported by   patient as heavy sensation on the right arm, difficulties with   words and feeling mildly off.  These events was in keeping with a   left hemisphere focal non-motor seizure, arising from the left   posterior quadrant. The findings confirm diagnosis of focal onset   epilepsy and suggest underlying areas of cortical irritability   and structural abnormality. Clinical and radiological correlation   is recommended.          ASSESSMENT AND PLAN:  1. 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 Do all this for 180 days.  Dispense: 105 Tab; Refill: 5  - zonisamide (ZONEGRAN) 100 MG Cap; Take 2 Caps by mouth every bedtime.  Dispense: 60 Cap; Refill: 11    2. Vitamin D deficiency  - CBC WITH DIFFERENTIAL; Future  - Comp Metabolic Panel; Future  - VITAMIN D,25 HYDROXY; Future  - ZONISAMIDE (ZONEGRAN); Future    3.  Panhypopituitarism (HCC)    4. Adverse effect of antiepileptic, initial encounter    5. Encephalomalacia    6. Malignant neoplasm of overlapping sites of brain (HCC)    7. Adverse effect of anticonvulsants, subsequent encounter    8. Localization-related (focal) (partial) idiopathic epilepsy and epileptic syndromes with seizures of localized onset, intractable, without status epilepticus (HCC)    9. 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  History of left temporal PNET, on remission, followed by yearly MRIs.  Last MRI showed no recurrent lesions (12/11/2018).  Status post resection, chemo and radiation.  Large area of encephalomalacia likely dysfunctional tissue and suspected epileptogenic focus to account for her focal onset seizures.  Had not had a seizure in about 2 years, but with sleep deprivation and holding phenytoin had a focal left hemisphere seizure during the last EMU admission in August 2019 (seizure consisting of right arm heaviness, mild difficulties with words, mild confusion).  This meant she will require more than one antiepileptic for seizure control.  The patient had been on Dilantin 300 mg nightly at home, she was complaining of toxicity, fluctuating levels, and they wanted to come off the Dilantin.  The patient apparently also had some sort of side effect to Vimpat in the past, as well as Keppra.  The patient and the mother ultimately decided a combination of Briviact 100 mg twice a day, and clobazam 15 mg in the morning and 20 mg at bedtime.  She had been doing great on this regimen, without any side effects, except for a new onset of a rash earlier this year, ultimately coming off the Briviact.  The decision was made to add Zonisamide, and the dose was titrated upwards to 200 mg nightly.  The patient is tolerating the medication regimen very well, and does not complain  of any further rashes.     AED regimen:  -Onfi 50 mg in the morning and 20 mg nightly  -Zonisamide 200 mg nightly    The patient continues to refuse to take folic acid.  She was on folic acid for supplementation while on Dilantin.  The mother requested a recheck level of the folic acid, which was normal.      The patient and the mother indicate that the patient has never been and will never be sexually active.  They are refusing folic acid and any possible contraception.     The patient does not drive, and will remain as such.       Chronic right sided hypoesthesia and right homonymous hemianopia are postsurgical.     H/o vit D deficiency, resolved, recommend daily over-the-counter supplementation.  They forgot to have blood work done, these were reordered to today.    Compliance discussed.     Patient and mother agree with the plan, as outlined.            FOLLOW-UP:   Return in about 6 months (around 2/27/2021).      EDUCATION AND COUNSELING:  -Education was provided to the patient and/or family regarding diagnosis and prognosis. The chronic and unpredictable nature of the condition were discussed. There is increased risk for additional events, which may carry potential for significant injuries and death. Discussed frequent seizure triggers: sleep deprivation, medication non-compliance, use of illegal drugs/alcohol, stress, and others.   -We reviewed in detail the current antiepileptic regimen. Potential side effects of antiepileptics were discussed at length, including but no limited to: hypersensitivity reactions (rash and others, some of which can be fatal), visual field changes (some of which may be irreversible), glaucoma, diplopia, kidney stones, osteopenia/osteoporosis/bone fractures, hyperthermia/anhydrosis, hyponatremia, tremors/abnormal movements, ataxia, dizziness, fatigue, increased risk for falls, risk for cardiac arrhythmias/syncope, gastrointestinal side effects(hepatitis, pancreatitis, gastritis,  ulcers), gingival hypertrophy/bleeding, drowsiness, sedation, anxiety/nervousness, increased risk for suicide, increased risk for depression, and psychosis.   -We also reviewed drug-drug interactions and their potential effect on seizure control and medication side effects.    -We also discussed in detail potential effects of seizures, epilepsy, and medications during pregnancy, including but not limited to fetal malformations, child developmental/intellectual disability, fetal/ risk for hemorrhages, stillbirth, maternal death, premature birth, and others. The patient/family aware that pregnancy should be avoided, unless desired, in which case we recommend discussing with us at least a year prior to planned conception. To avoid undesired pregnancy while on antiepileptics, we recommend dual contraception.  Patient/family indicated that she is not and will never be sexually active.  -Folic acid 2 mg is recommended for all females in childbearing age (12-44 years of age).   -Recommend chronic vitamin D supplementation and regular exercise (if not contraindicated).   -Patient/family educated on risk for SUDEP (Sudden Death in Epilepsy). Counseling was provided on the importance of strict medication and follow up compliance. The patient/family understand the risks associated with non-adherence with the medical plan as outlined, including but not limited to an increased risk for breakthrough seizures, which may contribute to injuries, disability, status epilepticus, and even death.   -Counseling was also provided on potential effects of alcohol and other drugs, which may lower seizure threshold and/or affect the metabolism of antiepileptic drugs. We recommend avoidance of alcohol and illegal drugs.  They deny use.  -Avoid sleep deprivation.   -We extensively discussed the aspects related to safety in drivers who suffer from epilepsy. The patient is encourage to report to the Division of Motor Vehicles of any  condition and/or spells related to confusion, disorientation, and/or loss of awareness and/or loss of consciousness; as these may pose a safety issue if they occur while operating a motor vehicle. The patient and/or family are ultimately responsible for exercising caution and abiding to regulations in place.  Patient does not drive.  -Other seizure precautions were discussed at length, including no diving, no skydiving, no climbing or exposure to unprotected heights, no unsupervised swimming, no Jacuzzi or bathing in bathtubs or deep bodies of water. The patient/family have been advised about risks for operating any machinery while suffering from seizures / syncope / epilepsy and/or while taking antiepileptic drugs.   -The patient understands and agrees that due to the complexity of his/her diagnosis, results of any testing and further recommendations will typically be discussed/made during a face to face encounter in my office. The patient and/or family further understands it is their responsibility to keep proper follow up.     Patient/family agree with plan, as outlined.         Alberto Herbetr MD   Epilepsy and Neurodiagnostics.   Clinical  of Neurology [facility].   Diplomate in Neurology, Epilepsy, and Electrodiagnostic Medicine.   Office: [phone]  Fax: [phone]      BILLING DOCUMENTATION:     I have performed physical exam and reviewed and updated ROS and plan today 8/27/2020. In review of that note, there are no new changes except as documented above.     Counseling:  I spent greater than 50% time face-to-face time of a total of 45 minutes visit. Over 50% of the time of the visit today was spent on counseling and or coordination of care wtih the patient and/or family, with greater than 50% of the total discussing my assessment and plan as stated above.

## 2020-08-31 LAB
25(OH)D3+25(OH)D2 SERPL-MCNC: 31.1 NG/ML (ref 30–100)
ALBUMIN SERPL-MCNC: 4.5 G/DL (ref 3.9–5)
ALBUMIN/GLOB SERPL: 1.7 {RATIO} (ref 1.2–2.2)
ALP SERPL-CCNC: 87 IU/L (ref 39–117)
ALT SERPL-CCNC: 14 IU/L (ref 0–32)
AST SERPL-CCNC: 17 IU/L (ref 0–40)
BASOPHILS # BLD AUTO: 0 X10E3/UL (ref 0–0.2)
BASOPHILS NFR BLD AUTO: 0 %
BILIRUB SERPL-MCNC: 0.3 MG/DL (ref 0–1.2)
BUN SERPL-MCNC: 11 MG/DL (ref 6–20)
BUN/CREAT SERPL: 12 (ref 9–23)
CALCIUM SERPL-MCNC: 9.2 MG/DL (ref 8.7–10.2)
CHLORIDE SERPL-SCNC: 105 MMOL/L (ref 96–106)
CO2 SERPL-SCNC: 18 MMOL/L (ref 20–29)
CREAT SERPL-MCNC: 0.95 MG/DL (ref 0.57–1)
EOSINOPHIL # BLD AUTO: 0.1 X10E3/UL (ref 0–0.4)
EOSINOPHIL NFR BLD AUTO: 1 %
ERYTHROCYTE [DISTWIDTH] IN BLOOD BY AUTOMATED COUNT: 12.3 % (ref 11.7–15.4)
GLOBULIN SER CALC-MCNC: 2.6 G/DL (ref 1.5–4.5)
GLUCOSE SERPL-MCNC: 78 MG/DL (ref 65–99)
HCT VFR BLD AUTO: 42.5 % (ref 34–46.6)
HGB BLD-MCNC: 14.4 G/DL (ref 11.1–15.9)
IMM GRANULOCYTES # BLD AUTO: 0 X10E3/UL (ref 0–0.1)
IMM GRANULOCYTES NFR BLD AUTO: 0 %
IMMATURE CELLS  115398: NORMAL
LYMPHOCYTES # BLD AUTO: 3.1 X10E3/UL (ref 0.7–3.1)
LYMPHOCYTES NFR BLD AUTO: 44 %
MCH RBC QN AUTO: 31.8 PG (ref 26.6–33)
MCHC RBC AUTO-ENTMCNC: 33.9 G/DL (ref 31.5–35.7)
MCV RBC AUTO: 94 FL (ref 79–97)
MONOCYTES # BLD AUTO: 0.6 X10E3/UL (ref 0.1–0.9)
MONOCYTES NFR BLD AUTO: 8 %
MORPHOLOGY BLD-IMP: NORMAL
NEUTROPHILS # BLD AUTO: 3.3 X10E3/UL (ref 1.4–7)
NEUTROPHILS NFR BLD AUTO: 47 %
NRBC BLD AUTO-RTO: NORMAL %
PLATELET # BLD AUTO: 191 X10E3/UL (ref 150–450)
POTASSIUM SERPL-SCNC: 3.8 MMOL/L (ref 3.5–5.2)
PROT SERPL-MCNC: 7.1 G/DL (ref 6–8.5)
RBC # BLD AUTO: 4.53 X10E6/UL (ref 3.77–5.28)
SODIUM SERPL-SCNC: 140 MMOL/L (ref 134–144)
WBC # BLD AUTO: 7.1 X10E3/UL (ref 3.4–10.8)
ZONISAMIDE SERPL-MCNC: 13.2 UG/ML (ref 10–40)

## 2020-08-31 RX ORDER — ERGOCALCIFEROL 1.25 MG/1
50000 CAPSULE ORAL
Qty: 4 CAP | Refills: 5 | Status: SHIPPED | OUTPATIENT
Start: 2020-08-31 | End: 2021-02-08

## 2020-09-01 ENCOUNTER — TELEPHONE (OUTPATIENT)
Dept: NEUROLOGY | Facility: MEDICAL CENTER | Age: 25
End: 2020-09-01

## 2020-09-01 NOTE — TELEPHONE ENCOUNTER
Please inform patient vitamin D was on the low side, prescription for weekly vitamin D supplementation for 6 months has been sent to the pharmacy.   Thanks,   RA

## 2021-01-19 ENCOUNTER — TELEPHONE (OUTPATIENT)
Dept: NEUROLOGY | Facility: MEDICAL CENTER | Age: 26
End: 2021-01-19

## 2021-01-20 NOTE — TELEPHONE ENCOUNTER
Yes, we are recommending for our patients. No contraindication in those with epilepsy.   RA    I left a detailed vm for pt.

## 2021-02-03 ENCOUNTER — OFFICE VISIT (OUTPATIENT)
Dept: NEUROLOGY | Facility: MEDICAL CENTER | Age: 26
End: 2021-02-03
Attending: PSYCHIATRY & NEUROLOGY
Payer: MEDICARE

## 2021-02-03 VITALS
RESPIRATION RATE: 16 BRPM | HEIGHT: 61 IN | HEART RATE: 70 BPM | BODY MASS INDEX: 24.14 KG/M2 | SYSTOLIC BLOOD PRESSURE: 91 MMHG | TEMPERATURE: 98 F | OXYGEN SATURATION: 94 % | DIASTOLIC BLOOD PRESSURE: 58 MMHG | WEIGHT: 127.87 LBS

## 2021-02-03 DIAGNOSIS — R62.0 DELAYED MILESTONE IN CHILDHOOD: ICD-10-CM

## 2021-02-03 DIAGNOSIS — T42.75XD: ICD-10-CM

## 2021-02-03 DIAGNOSIS — Z13.31 SCREENING FOR DEPRESSION: ICD-10-CM

## 2021-02-03 DIAGNOSIS — R21 RASH: ICD-10-CM

## 2021-02-03 DIAGNOSIS — Z30.09 ENCOUNTER FOR COUNSELING REGARDING CONTRACEPTION: ICD-10-CM

## 2021-02-03 DIAGNOSIS — E55.9 VITAMIN D DEFICIENCY: ICD-10-CM

## 2021-02-03 DIAGNOSIS — G93.89 ENCEPHALOMALACIA: ICD-10-CM

## 2021-02-03 DIAGNOSIS — E23.0 PANHYPOPITUITARISM (HCC): ICD-10-CM

## 2021-02-03 DIAGNOSIS — G40.219 PHARMACORESISTANT PARTIAL EPILEPSY WITH IMPAIRMENT OF CONSCIOUSNESS, INTRACTABLE (HCC): ICD-10-CM

## 2021-02-03 DIAGNOSIS — C71.9: ICD-10-CM

## 2021-02-03 PROCEDURE — 99212 OFFICE O/P EST SF 10 MIN: CPT | Performed by: PSYCHIATRY & NEUROLOGY

## 2021-02-03 PROCEDURE — 99202 OFFICE O/P NEW SF 15 MIN: CPT | Performed by: PSYCHIATRY & NEUROLOGY

## 2021-02-03 PROCEDURE — 99215 OFFICE O/P EST HI 40 MIN: CPT | Performed by: PSYCHIATRY & NEUROLOGY

## 2021-02-03 RX ORDER — ZONISAMIDE 100 MG/1
200 CAPSULE ORAL
Qty: 60 CAP | Refills: 11 | Status: SHIPPED | OUTPATIENT
Start: 2021-02-03 | End: 2021-08-04

## 2021-02-03 RX ORDER — LORAZEPAM 0.5 MG/1
0.5 TABLET ORAL EVERY 8 HOURS PRN
Qty: 30 TAB | Refills: 1 | Status: SHIPPED | OUTPATIENT
Start: 2021-02-03 | End: 2021-08-02

## 2021-02-03 RX ORDER — CLOBAZAM 10 MG/1
TABLET ORAL
Qty: 105 TAB | Refills: 5 | Status: SHIPPED | OUTPATIENT
Start: 2021-02-03 | End: 2021-08-02

## 2021-02-03 ASSESSMENT — PATIENT HEALTH QUESTIONNAIRE - PHQ9: CLINICAL INTERPRETATION OF PHQ2 SCORE: 0

## 2021-02-03 ASSESSMENT — FIBROSIS 4 INDEX: FIB4 SCORE: 0.57

## 2021-02-03 NOTE — PROGRESS NOTES
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  The patient is doing very well, but had 2 tiny spells of abnormal sensation on the right arm, which typically will precede the seizures, but this did not evolve.  She was not confused and had no jerky activity.  She continues doing well, very compliant, without side effects, on a combination of zonisamide and Onfi.  She denies any side effects, particularly no rash, dizziness, memory changes or mood changes.  Her mood is great, she is not depressed or suicidal.  She does not drive.  She follows with endocrinologist for history of panhypopituitarism.  She is not sexually active, and stopped taking folic acid on her own, she is indicating she will not become sexually active ever.  She states at home with her family, they are supportive.  She would like to get the Covid vaccine, but is afraid to have a breakthrough seizure if she gets it.  She is asking for advice if she gets the vaccine if she needs to take any medications to prevent seizures.  She denies any other issues at this point in time.      Past medical history:    Past Medical History:   Diagnosis Date   • Cancer (HCC)     PNET tumor   • Febrile seizures (HCC)     until age 6 yr   • H/O stem cell transplant (HCC)    • Hypopituitarism (HCC)    • Poor short term memory    • Primary ovarian failure    • Radiation exposure     craniospinal, and tumor resection   • Right foot drop    • Seizures (HCC)     started in January 2nd to chemo   • Supratentorial PNET (HCC)     ds 6/2009   • Unspecified hemorrhagic conditions     bone marrow transplant       Past surgical history:   Past Surgical History:   Procedure Laterality Date   • PB BONE MARROW/STEM XPLANT,ALLOGENIC  12/22/09   • OTHER NEUROLOGICAL SURG      tumor resection x2, 6/28/09 & 11/20/09   • IN [place] DEL,SIMPLE      completed 30 rounds radiation        Family history:   Family History   Family history unknown: Yes       Social history:   Social History     Socioeconomic History   • Marital status: Single     Spouse name: Not on file   • Number of children: Not on file   • Years of education: Not on file   • Highest education level: Not on file   Occupational History   • Not on file   Social Needs   • Financial resource strain: Not on file   • Food insecurity     Worry: Not on file     Inability: Not on file   • Transportation needs     Medical: Not on file     Non-medical: Not on file   Tobacco Use   • Smoking status: Never Smoker   • Smokeless tobacco: Never Used   Substance and Sexual Activity   • Alcohol use: No   • Drug use: No   • Sexual activity: Not on file   Lifestyle   • Physical activity     Days per week: Not on file     Minutes per session: Not on file   • Stress: Not on file   Relationships   • Social connections     Talks on phone: Not on file     Gets together: Not on file     Attends Oriental orthodox service: Not on file     Active member of club or organization: Not on file     Attends meetings of clubs or organizations: Not on file     Relationship status: Not on file   • Intimate partner violence      "Fear of current or ex partner: Not on file     Emotionally abused: Not on file     Physically abused: Not on file     Forced sexual activity: Not on file   Other Topics Concern   • Not on file   Social History Narrative    Adopted from [place] @ 13 months    Graduated [employer] 6/2014, back at school [employer] for vocational training    Also volunteers at [facility] lives with mom and 2 younger adopted sisters (not bio)       Current medications:   Current Outpatient Medications   Medication   • vitamin D, Ergocalciferol, (DRISDOL) 1.25 MG (66275 UT) Cap capsule   • ONFI 10 MG Tab tablet   • zonisamide (ZONEGRAN) 100 MG Cap   • levothyroxine (SYNTHROID) 50 MCG Tab   • BLISOVI FE 1/20 1-20 MG-MCG per tablet   • hydrocortisone (CORTEF) 5 MG Tab   • LORazepam (ATIVAN) 0.5 MG Tab   • hydrocortisone sodium succinate PF (SOLU-CORTEF) 100 MG Recon Soln injection   • therapeutic multivitamin-minerals (THERAGRAN-M) Tab     No current facility-administered medications for this visit.        Medication Allergy:  Allergies   Allergen Reactions   • Oxcarbazepine Rash   • Lamotrigine          Review of systems:   As reviewed in today's history.       Physical examination:   Vitals:    02/03/21 1053   BP: (!) 91/58   BP Location: Left arm   Patient Position: Sitting   BP Cuff Size: Adult   Pulse: 70   Resp: 16   Temp: 36.7 °C (98 °F)   TempSrc: Temporal   SpO2: 94%   Weight: 58 kg (127 lb 13.9 oz)   Height: 1.549 m (5' 1\")     General: Patient in no acute distress, pleasant and cooperative.  HEENT: Normocephalic, no signs of acute trauma.   Neck: supple, no meningeal signs or carotid bruits. There is normal range of motion. No tenderness on exam.   Chest: clear to auscultation. No cough.   CV: RRR, no murmurs.   Skin: no signs of acute rashes or trauma.   Musculoskeletal: joints exhibit full range of motion, without any pain to palpation. There are no signs of joint or muscle swelling. There is no tenderness to deep palpation of muscles. "   Psychiatric: No hallucinatory behavior. Denies symptoms of depression or suicidal ideation. Mood and affect appear normal on exam.     NEUROLOGICAL EXAM:   Mental status, orientation: Awake, alert and fully oriented.   Speech and language: speech is clear and fluent. The patient is able to name, repeat and comprehend.   Memory: There is intact recollection of recent and remote events.   Cranial nerve exam: Pupils are 3-4 mm bilaterally and equally reactive to light and accommodation. Visual fields are intact by confrontation. There is no nystagmus on primary or secondary gaze. Intact full EOM in all directions of gaze. Face appears symmetric. Sensation in the face is intact to light touch. Uvula is midline. Palate elevates symmetrically. Tongue is midline and without any signs of tongue biting or fasciculations.Sternocleidomastoid muscles exhibit is normal strength bilaterally. Shoulder shrug is intact bilaterally.   Motor exam: Strength is 5/5 in left-sided extremities, mild hemiparesis on the right, with a mild right foot drop, wears an AFO. Tone is normal. No abnormal movements were seen on exam.   Sensory exam reveals normal sense of light touch in all extremities.   Deep tendon reflexes:  2+ throughout, except for right ankle jerk the could not be examined because of AFO. Plantar responses are flexor. There is no clonus.   Coordination: shows a normal finger-nose-finger. Normal rapidly alternating movements.   Gait: The patient was able to get up from seated position on first attempt without requiring assistance. She exhibits a mildly hemiparetic gait.  Found to be steady when walking. Movements were fluid with normal arm swing. The patient was able to turn without difficulties or tendency to fall.       ANCILLARY DATA REVIEWED:       Lab Data Review:  Reviewed in chart.    Records reviewed:   Chart reviewed.  Imaging:   MRI brain with and without, 12/11/2018:  1.  There is no recurrent lesion.  2.  Stable  postsurgical changes.  3.  Multifocal areas of stable encephalomalacia with chronic hemosiderin deposition in the left temporal, parietal and occipital lobes and left thalamus.  4.  Stable Nonspecific T2 hyperintensities in the frontal white matter likely representing nonspecific foci of gliosis.  (I personally reviewed images).     EEG:  Video EEG, 4/14/2010:  CLINICAL INTERPRETATION:  This is abnormal video EEG monitoring for  patient of this age.  The patient has continuous ictal discharges in  the left hemisphere consisting of spike and wave and polyspike and  wave discharges with a frequency of 1 Hz.  There was no response to  propofol and phenobarbital during this recording.  Clinical  correlation is required.     Video EEG/EMU, 8/19/2019:  This is an abnormal 5 days video electroencephalogram recording   in the awake, drowsy and sleep state. There is intermittent   slowing in the left hemisphere with frequent left frontotemporal   sharps noted. There are intermittent runs of up 8 seconds long of   rhythmic theta activity in the frontal regions, most pronounced   on the left. A seizure was captured during the study, reported by   patient as heavy sensation on the right arm, difficulties with   words and feeling mildly off.  These events was in keeping with a   left hemisphere focal non-motor seizure, arising from the left   posterior quadrant. The findings confirm diagnosis of focal onset   epilepsy and suggest underlying areas of cortical irritability   and structural abnormality. Clinical and radiological correlation   is recommended.            ASSESSMENT AND PLAN:  1. 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 Vitamin D deficiency  - CBC WITH DIFFERENTIAL; Future  - Comp Metabolic Panel; Future  - VITAMIN D,25 HYDROXY; Future    3. PNET (primitive  neuroectodermal tumor) of brain (HCC)    4. Panhypopituitarism (HCC)    5. Adverse effect of anticonvulsants, subsequent encounter    6. Delayed milestone in childhood    7. Screening for depression    8. Encephalomalacia    9. Rash       CLINICAL DISCUSSION:   Structural epilepsy, possibly pharmaco-resistant.  History of left temporal PNET, on remission, followed by yearly MRIs, but appears that she failed to have the MRI repeated last year.  Last MRI showed no recurrent lesions (12/11/2018).  Status post resection, chemo and radiation.  Large area of encephalomalacia likely dysfunctional tissue and suspected epileptogenic focus to account for her focal onset seizures.  Had not had a seizure in about 2 years, but with sleep deprivation and holding phenytoin had a focal left hemisphere seizure during the last EMU admission in August 2019 (seizure consisting of right arm heaviness, mild difficulties with words, mild confusion).  This meant she will require more than one antiepileptic for seizure control.  The patient had been on Dilantin 300 mg nightly at home, she was complaining of toxicity, fluctuating levels, and they wanted to come off the Dilantin.  The patient apparently also had some sort of side effect to Vimpat in the past, as well as Keppra.  The patient and the mother ultimately decided a combination of Briviact 100 mg twice a day, and clobazam 15 mg in the morning and 20 mg at bedtime.  She had been doing great on this regimen, without any side effects, except for a new onset of a rash earlier last year, ultimately came off the Briviact.  The decision was made to add Zonisamide, and the dose was titrated upwards to 200 mg nightly.  The patient is tolerating the medication regimen very well, and does not complain of any further rashes.  She had 2 tiny spells of right arm heavy sensation, which she indicates that the typical warning of a seizure, but he did not evolve.  She refuses to make any changes on  medications at this time.  She understands that we could potentially increase the dose of Zonisamide to provide better protection, but she would like to wait, and will let us know if she has additional spells.     AED regimen:  -Onfi 15 mg in the morning and 20 mg nightly  -Zonisamide 200 mg nightly     The patient continues to refuse to take folic acid.  She was on folic acid for supplementation while on Dilantin.  The mother requested a recheck level of the folic acid, which was normal.      The patient and the mother indicate that the patient has never been and will never be sexually active.  They are refusing folic acid and any possible contraception.  The patient agrees that if she ever changes her mind, she will contact us before conceiving, and is well aware of possible complications to her and the fetus, including but not limited to risk for stillbirth, low weight birth, and several congenital malformations.     The patient does not drive, and will remain as such.       Chronic right sided hypoesthesia and right homonymous hemianopia are postsurgical.  Was seen by Dr. Erwin.      History of vitamin D deficiency, resolved, will repeat level.  Advised to continue over-the-counter supplementation.     Compliance discussed.     Patient and mother agree with the plan, as outlined.            FOLLOW-UP:   Return in about 6 months (around 8/3/2021).      EDUCATION AND COUNSELING:  -Education was provided to the patient and/or family regarding diagnosis and prognosis. The chronic and unpredictable nature of the condition were discussed. There is increased risk for additional events, which may carry potential for significant injuries and death. Discussed frequent seizure triggers: sleep deprivation, medication non-compliance, use of illegal drugs/alcohol, stress, and others.   -We reviewed in detail the current antiepileptic regimen. Potential side effects of antiepileptics were discussed at length, including but  no limited to: hypersensitivity reactions (rash and others, some of which can be fatal), visual field changes (some of which may be irreversible), glaucoma, diplopia, kidney stones, osteopenia/osteoporosis/bone fractures, hyperthermia/anhydrosis, hyponatremia, tremors/abnormal movements, ataxia, dizziness, fatigue, increased risk for falls, risk for cardiac arrhythmias and syncope, weight changes, hair loss, gastrointestinal side effects(hepatitis, pancreatitis, gastritis, ulcers, gingival hypertrophy/bleeding, drowsiness, sedation, memory loss, trouble finding words, anxiety/nervousness, increased risk for suicide, increased risk for depression, and psychosis.   -We also reviewed drug-drug interactions and their potential effect on seizure control and medication side effects.    -We also discussed in detail potential effects of seizures, epilepsy, and medications during pregnancy, including but not limited to fetal malformations, child developmental/intellectual disability, fetal/ risk for hemorrhages, stillbirth, maternal death, premature birth, and others. The patient/family aware that pregnancy should be avoided, unless desired, in which case we recommend discussing with us at least a year prior to planned conception. To avoid undesired pregnancy while on antiepileptics, we recommend dual contraception.   -Folic acid 2 mg is recommended for all females in childbearing age (12-44 years of age).   -Recommend chronic vitamin D supplementation and regular exercise (if not contraindicated).   -Patient/family educated on risk for SUDEP (Sudden Death in Epilepsy). Counseling was provided on the importance of strict medication and follow up compliance. The patient/family understand the risks associated with non-adherence with the medical plan as outlined, including but not limited to an increased risk for breakthrough seizures, which may contribute to injuries, disability, status epilepticus, and even death.    -Counseling was also provided on potential effects of alcohol and other drugs, which may lower seizure threshold and/or affect the metabolism of antiepileptic drugs. We recommend avoidance of alcohol and illegal drugs.  Denies use.  -Avoid sleep deprivation.   -We extensively discussed the aspects related to safety in drivers who suffer from epilepsy. The patient is encourage to report to the Division of Motor Vehicles of any condition and/or spells related to confusion, disorientation, and/or loss of awareness and/or loss of consciousness; as these may pose a safety issue if they occur while operating a motor vehicle. The patient and/or family are ultimately responsible for exercising caution and abiding to regulations in place.  Does not drive.  -Other seizure precautions were discussed.   -The patient understands and agrees that due to the complexity of his/her diagnosis, results of any testing and further recommendations will typically be discussed/made during a face to face encounter in my office. The patient and/or family further understands it is their responsibility to keep proper follow up.     Patient/family agree with plan, as outlined.         Alberto Herbert MD   Epilepsy and Neurodiagnostics.   Clinical  of Neurology [facility].   Diplomate in Neurology, Epilepsy, and Electrodiagnostic Medicine.   Office: [phone]  Fax: [phone]      BILLING DOCUMENTATION:     I have performed physical exam and reviewed and updated ROS and plan today 2/3/2021. In review of that note, there are no new changes except as documented above.     Counseling:  I spent greater than 50% time face-to-face time of a total of 46 minutes visit. Over 50% of the time of the visit today was spent on counseling and or coordination of care wtih the patient and/or family, with greater than 50% of the total discussing my assessment and plan as stated above.

## 2021-02-06 LAB
25(OH)D3+25(OH)D2 SERPL-MCNC: 71.4 NG/ML (ref 30–100)
ALBUMIN SERPL-MCNC: 4.1 G/DL (ref 3.9–5)
ALBUMIN/GLOB SERPL: 1.5 {RATIO} (ref 1.2–2.2)
ALP SERPL-CCNC: 83 IU/L (ref 39–117)
ALT SERPL-CCNC: 11 IU/L (ref 0–32)
AST SERPL-CCNC: 14 IU/L (ref 0–40)
BASOPHILS # BLD AUTO: 0 X10E3/UL (ref 0–0.2)
BASOPHILS NFR BLD AUTO: 0 %
BILIRUB SERPL-MCNC: 0.3 MG/DL (ref 0–1.2)
BUN SERPL-MCNC: 9 MG/DL (ref 6–20)
BUN/CREAT SERPL: 10 (ref 9–23)
CALCIUM SERPL-MCNC: 9.4 MG/DL (ref 8.7–10.2)
CHLORIDE SERPL-SCNC: 107 MMOL/L (ref 96–106)
CO2 SERPL-SCNC: 22 MMOL/L (ref 20–29)
CREAT SERPL-MCNC: 0.94 MG/DL (ref 0.57–1)
EOSINOPHIL # BLD AUTO: 0 X10E3/UL (ref 0–0.4)
EOSINOPHIL NFR BLD AUTO: 0 %
ERYTHROCYTE [DISTWIDTH] IN BLOOD BY AUTOMATED COUNT: 11.8 % (ref 11.7–15.4)
GLOBULIN SER CALC-MCNC: 2.7 G/DL (ref 1.5–4.5)
GLUCOSE SERPL-MCNC: 81 MG/DL (ref 65–99)
HCT VFR BLD AUTO: 43.3 % (ref 34–46.6)
HGB BLD-MCNC: 14.6 G/DL (ref 11.1–15.9)
IMM GRANULOCYTES # BLD AUTO: 0 X10E3/UL (ref 0–0.1)
IMM GRANULOCYTES NFR BLD AUTO: 0 %
IMMATURE CELLS  115398: NORMAL
LYMPHOCYTES # BLD AUTO: 3 X10E3/UL (ref 0.7–3.1)
LYMPHOCYTES NFR BLD AUTO: 40 %
MCH RBC QN AUTO: 32.2 PG (ref 26.6–33)
MCHC RBC AUTO-ENTMCNC: 33.7 G/DL (ref 31.5–35.7)
MCV RBC AUTO: 95 FL (ref 79–97)
MONOCYTES # BLD AUTO: 0.5 X10E3/UL (ref 0.1–0.9)
MONOCYTES NFR BLD AUTO: 7 %
MORPHOLOGY BLD-IMP: NORMAL
NEUTROPHILS # BLD AUTO: 3.9 X10E3/UL (ref 1.4–7)
NEUTROPHILS NFR BLD AUTO: 53 %
NRBC BLD AUTO-RTO: NORMAL %
PLATELET # BLD AUTO: 232 X10E3/UL (ref 150–450)
POTASSIUM SERPL-SCNC: 4.3 MMOL/L (ref 3.5–5.2)
PROT SERPL-MCNC: 6.8 G/DL (ref 6–8.5)
RBC # BLD AUTO: 4.54 X10E6/UL (ref 3.77–5.28)
SODIUM SERPL-SCNC: 142 MMOL/L (ref 134–144)
WBC # BLD AUTO: 7.5 X10E3/UL (ref 3.4–10.8)

## 2021-02-28 DIAGNOSIS — G40.219 PHARMACORESISTANT PARTIAL EPILEPSY WITH IMPAIRMENT OF CONSCIOUSNESS, INTRACTABLE (HCC): ICD-10-CM

## 2021-03-01 RX ORDER — MULTIVITAMIN WITH FOLIC ACID 400 MCG
TABLET ORAL
Qty: 90 TABLET | Refills: 3 | Status: SHIPPED | OUTPATIENT
Start: 2021-03-01 | End: 2022-01-09

## 2021-08-04 ENCOUNTER — OFFICE VISIT (OUTPATIENT)
Dept: NEUROLOGY | Facility: MEDICAL CENTER | Age: 26
End: 2021-08-04
Attending: PSYCHIATRY & NEUROLOGY
Payer: MEDICARE

## 2021-08-04 VITALS
HEART RATE: 86 BPM | HEIGHT: 61 IN | SYSTOLIC BLOOD PRESSURE: 100 MMHG | BODY MASS INDEX: 24.93 KG/M2 | TEMPERATURE: 98.3 F | WEIGHT: 132.06 LBS | DIASTOLIC BLOOD PRESSURE: 50 MMHG | OXYGEN SATURATION: 96 %

## 2021-08-04 DIAGNOSIS — T42.75XD: ICD-10-CM

## 2021-08-04 DIAGNOSIS — G81.90 HEMIPARESIS DUE TO NON-CEREBROVASCULAR ETIOLOGY, UNSPECIFIED LATERALITY (HCC): ICD-10-CM

## 2021-08-04 DIAGNOSIS — Z13.31 SCREENING FOR DEPRESSION: ICD-10-CM

## 2021-08-04 DIAGNOSIS — C71.9: ICD-10-CM

## 2021-08-04 DIAGNOSIS — Z30.09 ENCOUNTER FOR COUNSELING REGARDING CONTRACEPTION: ICD-10-CM

## 2021-08-04 DIAGNOSIS — E55.9 VITAMIN D DEFICIENCY: ICD-10-CM

## 2021-08-04 DIAGNOSIS — E23.0 PANHYPOPITUITARISM (HCC): ICD-10-CM

## 2021-08-04 DIAGNOSIS — R21 RASH: ICD-10-CM

## 2021-08-04 DIAGNOSIS — G93.89 ENCEPHALOMALACIA: ICD-10-CM

## 2021-08-04 DIAGNOSIS — R62.0 DELAYED MILESTONE IN CHILDHOOD: ICD-10-CM

## 2021-08-04 DIAGNOSIS — G40.219 PHARMACORESISTANT PARTIAL EPILEPSY WITH IMPAIRMENT OF CONSCIOUSNESS, INTRACTABLE (HCC): ICD-10-CM

## 2021-08-04 PROCEDURE — 99212 OFFICE O/P EST SF 10 MIN: CPT | Performed by: PSYCHIATRY & NEUROLOGY

## 2021-08-04 PROCEDURE — 99215 OFFICE O/P EST HI 40 MIN: CPT | Performed by: PSYCHIATRY & NEUROLOGY

## 2021-08-04 RX ORDER — LORAZEPAM 0.5 MG/1
0.5 TABLET ORAL
Qty: 20 TABLET | Refills: 3 | Status: SHIPPED | OUTPATIENT
Start: 2021-08-04 | End: 2021-12-30 | Stop reason: SDUPTHER

## 2021-08-04 RX ORDER — ZONISAMIDE 100 MG/1
300 CAPSULE ORAL
Qty: 270 CAPSULE | Refills: 3 | Status: SHIPPED | OUTPATIENT
Start: 2021-08-04 | End: 2021-12-30 | Stop reason: SDUPTHER

## 2021-08-04 RX ORDER — CLOBAZAM 10 MG/1
10 TABLET ORAL DAILY
COMMUNITY
End: 2021-08-04 | Stop reason: SDUPTHER

## 2021-08-04 RX ORDER — CLOBAZAM 10 MG/1
TABLET ORAL
Qty: 105 TABLET | Refills: 5 | Status: SHIPPED | OUTPATIENT
Start: 2021-08-04 | End: 2021-12-30 | Stop reason: SDUPTHER

## 2021-08-04 RX ORDER — LORAZEPAM 0.5 MG/1
0.5 TABLET ORAL EVERY 8 HOURS PRN
COMMUNITY
End: 2021-08-04 | Stop reason: SDUPTHER

## 2021-08-04 ASSESSMENT — FIBROSIS 4 INDEX: FIB4 SCORE: 0.45

## 2021-08-04 NOTE — PROGRESS NOTES
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, intractable (HCC) (Chronic)    Relevant Medications    zonisamide (ZONEGRAN) 100 MG Cap    clobazam (ONFI) 10 MG Tab tablet    LORazepam (ATIVAN) 0.5 MG Tab    Other Relevant Orders    CBC WITH DIFFERENTIAL    Comp Metabolic Panel    VITAMIN D,25 HYDROXY    Delayed milestone in childhood    Relevant Orders    CBC WITH DIFFERENTIAL    Comp Metabolic Panel    VITAMIN D,25 HYDROXY      Other Visit Diagnoses     Vitamin D deficiency        Relevant Orders    CBC WITH DIFFERENTIAL    Comp Metabolic Panel    VITAMIN D,25 HYDROXY    Panhypopituitarism (HCC)        Relevant Orders    CBC WITH DIFFERENTIAL    Comp Metabolic Panel    VITAMIN D,25 HYDROXY    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)        Relevant Orders    CBC WITH DIFFERENTIAL    Comp Metabolic Panel    VITAMIN D,25 HYDROXY    Adverse effect of anticonvulsants, subsequent encounter        Relevant Orders    CBC WITH DIFFERENTIAL    Comp Metabolic Panel    VITAMIN D,25 HYDROXY    Encephalomalacia        Relevant Orders    CBC WITH DIFFERENTIAL    Comp Metabolic Panel    VITAMIN D,25 HYDROXY    Encounter for counseling regarding contraception        Relevant Orders    CBC WITH DIFFERENTIAL    Comp Metabolic Panel    VITAMIN D,25 HYDROXY    Rash        Relevant Orders    CBC WITH DIFFERENTIAL    Comp Metabolic Panel    VITAMIN D,25 HYDROXY    Screening for depression        Relevant Orders    CBC WITH DIFFERENTIAL    Comp Metabolic Panel    VITAMIN D,25 HYDROXY    Hemiparesis due to non-cerebrovascular etiology, unspecified laterality (HCC)        Relevant Orders    CBC WITH DIFFERENTIAL    Comp Metabolic Panel    VITAMIN D,25 HYDROXY          Interim history:  Miles Georgina Lopez Guillerahul returns to clinic in company of her mother. She shared today that she had 5  seizures so far this year. These seizures have been brief and related to heaviness sensation on the right UE and inability to speak. She has not been confused and has not lost consciousness and has not have any GTC seizures. She has been fully compliant with her AEDs and uses a pill box. She remains on onfi and zonegran and denies any side effects. No further reports of rash. Mood is great, not depressed or suicidal. Does not drive. Not sexually active. Had covid 19 vaccine and no seizures. Has used ativan prn for seizures and requesting a new RX. Wants to travel to [facility] to see family.         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   • Unspecified hemorrhagic conditions     bone marrow transplant       Past surgical history:   Past Surgical History:   Procedure Laterality Date   • PB BONE MARROW/STEM XPLANT,ALLOGENIC  12/22/09   • OTHER NEUROLOGICAL SURG      tumor resection x2, 6/28/09 & 11/20/09   • HI PROTON BEAM DEL,SIMPLE      completed 30 rounds radiation        Family history:   Family History   Family history unknown: Yes       Social history:   Social History     Socioeconomic History   • Marital status: Single     Spouse name: Not on file   • Number of children: Not on file   • Years of education: Not on file   • Highest education level: Not on file   Occupational History   • Not on file   Tobacco Use   • Smoking status: Never Smoker   • Smokeless tobacco: Never Used   Vaping Use   • Vaping Use: Never used   Substance and Sexual Activity   • Alcohol use: No   • Drug use: No   • Sexual activity: Not on file    Other Topics Concern   • Not on file   Social History Narrative    Adopted from [place] @ 13 months    Graduated [employer] 6/2014, back at school [employer] for vocational training    Also volunteers at [facility] lives with mom and 2 younger adopted sisters (not bio)     Social Determinants of Health     Financial Resource Strain:    • Difficulty of Paying Living Expenses:    Food Insecurity:    • Worried About Running Out of Food in the Last Year:    • Ran Out of Food in the Last Year:    Transportation Needs:    • Lack of Transportation (Medical):    • Lack of Transportation (Non-Medical):    Physical Activity:    • Days of Exercise per Week:    • Minutes of Exercise per Session:    Stress:    • Feeling of Stress :    Social Connections:    • Frequency of Communication with Friends and Family:    • Frequency of Social Gatherings with Friends and Family:    • Attends Rastafari Services:    • Active Member of Clubs or Organizations:    • Attends Club or Organization Meetings:    • Marital Status:    Intimate Partner Violence:    • Fear of Current or Ex-Partner:    • Emotionally Abused:    • Physically Abused:    • Sexually Abused:        Current medications:   Current Outpatient Medications   Medication   • Ergocalciferol (VITAMIN D2 PO)   • zonisamide (ZONEGRAN) 100 MG Cap   • clobazam (ONFI) 10 MG Tab tablet   • LORazepam (ATIVAN) 0.5 MG Tab   • Multiple Vitamin (DAILY-KIM) Tab   • levothyroxine (SYNTHROID) 50 MCG Tab   • BLISOVI FE 1/20 1-20 MG-MCG per tablet   • hydrocortisone sodium succinate PF (SOLU-CORTEF) 100 MG Recon Soln injection   • therapeutic multivitamin-minerals (THERAGRAN-M) Tab     No current facility-administered medications for this visit.       Medication Allergy:  Allergies   Allergen Reactions   • Oxcarbazepine Rash   • Lamotrigine          Review of systems:   As reviewed today.     Physical examination:   Vitals:    08/04/21 1018   BP: 100/50   BP Location: Left arm   Patient Position: Sitting   BP  "Cuff Size: Adult   Pulse: 86   Temp: 36.8 °C (98.3 °F)   TempSrc: Temporal   SpO2: 96%   Weight: 59.9 kg (132 lb 0.9 oz)   Height: 1.549 m (5' 1\")     General: Patient in no acute distress, pleasant and cooperative.  HEENT: Normocephalic, no signs of acute trauma.   Neck: supple, no meningeal signs or carotid bruits. There is normal range of motion. No tenderness on exam.   Chest: clear to auscultation. No cough.   CV: RRR, no murmurs.   Skin: no signs of acute rashes or trauma.   Musculoskeletal: joints exhibit full range of motion, without any pain to palpation. There are no signs of joint or muscle swelling. There is no tenderness to deep palpation of muscles.   Psychiatric: No hallucinatory behavior. Denies symptoms of depression or suicidal ideation. Mood and affect appear normal on exam.     NEUROLOGICAL EXAM:   Mental status, orientation: Awake, alert and fully oriented.   Speech and language: speech is clear and fluent. The patient is able to name, repeat and comprehend.   Memory: There is intact recollection of recent and remote events.   Cranial nerve exam: Pupils are 3-4 mm bilaterally and equally reactive to light and accommodation. Visual fields are intact by confrontation. There is no nystagmus on primary or secondary gaze. Intact full EOM in all directions of gaze. Face appears symmetric. Sensation in the face is intact to light touch. Uvula is midline. Palate elevates symmetrically. Tongue is midline and without any signs of tongue biting or fasciculations.Sternocleidomastoid muscles exhibit is normal strength bilaterally. Shoulder shrug is intact bilaterally.   Motor exam: Mild right hemiparesis, worse in the rt foot with foot drop, wears AFO. Tone is normal. No abnormal movements were seen on exam.   Sensory exam reveals normal sense of light touch, proprioception, vibration and pinprick in all extremities.   Deep tendon reflexes:  2+ throughout, except brisk on the RLE. Plantar responses are " flexor. There is no clonus.   Coordination: shows a normal finger-nose-finger. Normal rapidly alternating movements.   Gait: Mildly hemiparetic on the right. Steady.         ANCILLARY DATA REVIEWED:       Lab Data Review:  Reviewed in chart.     Records reviewed:   Chart reviewed.     Imaging:   MRI brain with and without, 12/11/2018:  1.  There is no recurrent lesion.  2.  Stable postsurgical changes.  3.  Multifocal areas of stable encephalomalacia with chronic hemosiderin deposition in the left temporal, parietal and occipital lobes and left thalamus.  4.  Stable Nonspecific T2 hyperintensities in the frontal white matter likely representing nonspecific foci of gliosis.  (I personally reviewed images).     EEG:  Video EEG, 4/14/2010:  CLINICAL INTERPRETATION:  This is abnormal video EEG monitoring for  patient of this age.  The patient has continuous ictal discharges in  the left hemisphere consisting of spike and wave and polyspike and  wave discharges with a frequency of 1 Hz.  There was no response to  propofol and phenobarbital during this recording.  Clinical  correlation is required.     Video EEG/EMU, 8/19/2019:  This is an abnormal 5 days video electroencephalogram recording   in the awake, drowsy and sleep state. There is intermittent   slowing in the left hemisphere with frequent left frontotemporal   sharps noted. There are intermittent runs of up 8 seconds long of   rhythmic theta activity in the frontal regions, most pronounced   on the left. A seizure was captured during the study, reported by   patient as heavy sensation on the right arm, difficulties with   words and feeling mildly off.  These events was in keeping with a   left hemisphere focal non-motor seizure, arising from the left   posterior quadrant. The findings confirm diagnosis of focal onset   epilepsy and suggest underlying areas of cortical irritability   and structural abnormality. Clinical and radiological correlation   is  recommended.       ASSESSMENT AND PLAN:  1. 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  Dispense: 270 capsule; Refill: 3  - clobazam (ONFI) 10 MG Tab tablet; Take 1.5 Tablets by mouth every day AND 2 Tablets at bedtime. Do all this for 180 days. 1.5 in morning and 2 at bedtime  Dispense: 105 tablet; Refill: 5  - CBC WITH DIFFERENTIAL; Future  - Comp Metabolic Panel; Future  - VITAMIN D,25 HYDROXY; Future  - LORazepam (ATIVAN) 0.5 MG Tab; Take 1 tablet by mouth 1 time a day as needed (seizures) for up to 180 days.  Dispense: 20 tablet; Refill: 3    2. Vitamin D deficiency  - CBC WITH DIFFERENTIAL; Future  - Comp Metabolic Panel; Future  - VITAMIN D,25 HYDROXY; Future    3. Panhypopituitarism (HCC)  - CBC WITH DIFFERENTIAL; Future  - Comp Metabolic Panel; Future  - VITAMIN D,25 HYDROXY; Future    4. Delayed milestone in childhood  - CBC WITH DIFFERENTIAL; Future  - Comp Metabolic Panel; Future  - VITAMIN D,25 HYDROXY; Future    5. 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 Adverse effect of anticonvulsants, subsequent encounter  - CBC WITH DIFFERENTIAL; Future  - Comp Metabolic Panel; Future  - VITAMIN D,25 HYDROXY; Future    7. Encephalomalacia  - CBC WITH DIFFERENTIAL; Future  - Comp Metabolic Panel; Future  - VITAMIN D,25 HYDROXY; Future    8. Encounter for counseling regarding contraception  - CBC WITH DIFFERENTIAL; Future  - Comp Metabolic Panel; Future  - VITAMIN D,25 HYDROXY; Future    9. Rash  - CBC WITH DIFFERENTIAL; Future  - Comp Metabolic Panel; Future  - VITAMIN D,25 HYDROXY; Future    10. Screening for depression  - CBC WITH DIFFERENTIAL; Future  - Comp Metabolic Panel; Future  - VITAMIN D,25 HYDROXY; Future    11. Hemiparesis due to non-cerebrovascular etiology, unspecified  laterality (HCC)  - CBC WITH DIFFERENTIAL; Future  - Comp Metabolic Panel; Future  - VITAMIN D,25 HYDROXY; Future    Other orders  - Ergocalciferol (VITAMIN D2 PO); Take  by mouth every day.           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  History of left temporal PNET, on remission, followed by yearly MRIs, chose to not have the MRI repeated last year.  Last MRI showed no recurrent lesions (12/11/2018).  Status post resection, chemo and radiation.  Large area of encephalomalacia likely dysfunctional tissue and suspected epileptogenic focus to account for her focal onset seizures.  Had not had a seizure in about 2 years, but with sleep deprivation and holding phenytoin had a focal left hemisphere seizure during the last EMU admission in August 2019 (seizure consisting of right arm heaviness, mild difficulties with words, mild confusion).  This meant she will require more than one antiepileptic for seizure control.  The patient had been on Dilantin 300 mg nightly at home, she was complaining of toxicity, fluctuating levels, and they wanted to come off the Dilantin.  The patient apparently also had some sort of side effect to Vimpat in the past, as well as Keppra.  The patient and the mother ultimately decided a combination of Briviact 100 mg twice a day, and clobazam 15 mg in the morning and 20 mg at bedtime.  She was doing great on that regimen, without any side effects, but then developed a new rash, which ultimately resolved after she came off Briviact. The decision was made at that time to add Zonisamide, and the dose was titrated upwards to 200 mg nightly.  The patient is tolerating the medication regimen very well, and does not complain of any further rashes. Unfortunately, she continues to have brief focal non motor seizures without changes in awareness, without clear provoking factors. We had extensive discussion today, which included adding another AED, increasing zonegran,  or placing VNS. They decided to increase dose of zonisamide and will keep us posted of any further spells.        AED regimen:  -Onfi 15 mg in the morning and 20 mg nightly  -Increase Zonisamide from 200 mg nightly to 300 mg nightly.      The patient continues to refuse to take folic acid. The patient and the mother indicate that the patient has never been and will never be sexually active.  They are refusing folic acid and any possible contraception.  The patient agrees that if she ever changes her mind, she will contact us before conceiving, and is well aware of possible complications to her and the fetus, including but not limited to risk for stillbirth, low weight birth, and several congenital malformations.     The patient does not drive, and will remain as such.       Chronic right sided hypoesthesia and right homonymous hemianopia are postsurgical.  Was seen by Dr. Erwin.      History of vitamin D deficiency, resolved, will repeat level.  Advised to continue over-the-counter supplementation.     Compliance discussed.    No clear contraindication to travel to see family, but pt and mother aware of risk for seizures. Has RX of ativan to take prn if seizures occur.      Patient and mother agree with the plan, as outlined.              FOLLOW-UP:   Return in about 3 months (around 11/4/2021).      EDUCATION AND COUNSELING:  -Education was provided to the patient and/or family regarding diagnosis and prognosis. The chronic and unpredictable nature of the condition were discussed. There is increased risk for additional events, which may carry potential for significant injuries and death. Discussed frequent seizure triggers: sleep deprivation, medication non-compliance, use of illegal drugs/alcohol, stress, and others.   -We reviewed in detail the current antiepileptic regimen. Potential side effects of antiepileptics were discussed at length, including but no limited to: hypersensitivity reactions (rash and  others, some of which can be fatal), visual field changes (some of which may be irreversible), glaucoma, diplopia, kidney stones, osteopenia/osteoporosis/bone fractures, hyperthermia/anhydrosis, hyponatremia, tremors/abnormal movements, ataxia, dizziness, fatigue, increased risk for falls, risk for cardiac arrhythmias and syncope, weight changes, hair loss, gastrointestinal side effects(hepatitis, pancreatitis, gastritis, ulcers, gingival hypertrophy/bleeding, drowsiness, sedation, memory loss, trouble finding words, anxiety/nervousness, increased risk for suicide, increased risk for depression, and psychosis.   -We also reviewed drug-drug interactions and their potential effect on seizure control and medication side effects.    -We also discussed in detail potential effects of seizures, epilepsy, and medications during pregnancy, including but not limited to fetal malformations, child developmental/intellectual disability, fetal/ risk for hemorrhages, stillbirth, maternal death, premature birth, and others. The patient/family aware that pregnancy should be avoided, unless desired, in which case we recommend discussing with us at least a year prior to planned conception. To avoid undesired pregnancy while on antiepileptics, we recommend dual contraception. Not sexually active.    -Folic acid 2 mg is recommended for all females in childbearing age (12-44 years of age).   -Recommend chronic vitamin D supplementation and regular exercise (if not contraindicated).   -Patient/family educated on risk for SUDEP (Sudden Death in Epilepsy). Counseling was provided on the importance of strict medication and follow up compliance. The patient/family understand the risks associated with non-adherence with the medical plan as outlined, including but not limited to an increased risk for breakthrough seizures, which may contribute to injuries, disability, status epilepticus, and even death.   -Counseling was also provided on  potential effects of alcohol and other drugs, which may lower seizure threshold and/or affect the metabolism of antiepileptic drugs. We recommend avoidance of alcohol and illegal drugs. Denies use.   -Avoid sleep deprivation.   -We extensively discussed the aspects related to safety in drivers who suffer from epilepsy. The patient is encourage to report to the Division of Motor Vehicles of any condition and/or spells related to confusion, disorientation, and/or loss of awareness and/or loss of consciousness; as these may pose a safety issue if they occur while operating a motor vehicle. The patient and/or family are ultimately responsible for exercising caution and abiding to regulations in place. The patient understands that only the Department of Motor Vehicles (DMV) is able to authorize an individual to drive, even after medical clearance, DMV clearance must be obtained. Does not drive.   -Other seizure precautions were discussed at length, including no diving, no skydiving, no climbing or exposure to unprotected heights, no unsupervised swimming, no Jacuzzi or bathing in bathtubs or deep bodies of water. The patient/family have been advised about risks for operating any machinery while suffering from seizures / syncope / epilepsy and/or while taking antiepileptic drugs.   -The patient understands and agrees that due to the complexity of his/her diagnosis, results of any testing and further recommendations will typically be discussed/made during a face to face encounter in my office. The patient and/or family further understands it is their responsibility to keep proper follow up.     Patient/family agree with plan, as outlined.         Alberto Herbert MD   Epilepsy and Neurodiagnostics.   Clinical  of Neurology [facility].   Diplomate in Neurology, Epilepsy, and Electrodiagnostic Medicine.   Office: [phone]  Fax: [phone]    BILLING  DOCUMENTATION:     I have performed physical exam and reviewed and updated ROS and plan today 8/4/2021. In review of that note, there are no new changes except as documented above.     Counseling:  I spent greater than 50% time face-to-face time of a total of 44 minutes visit. Over 50% of the time of the visit today was spent on counseling and or coordination of care wtih the patient and/or family, with greater than 50% of the total discussing my assessment and plan as stated above.

## 2021-08-13 NOTE — TELEPHONE ENCOUNTER
Received request via: Patient    Was the patient seen in the last year in this department? Yes    Does the patient have an active prescription (recently filled or refills available) for medication(s) requested? Yes- Family is traveling and wants refill at [place] pharmacy.

## 2021-08-19 ENCOUNTER — TELEPHONE (OUTPATIENT)
Dept: NEUROLOGY | Facility: MEDICAL CENTER | Age: 26
End: 2021-08-19

## 2021-08-19 NOTE — TELEPHONE ENCOUNTER
Per pt's mother, pt is visiting her uncle in [place], [place] until next Tuesday and is requesting a 5 day fill for her on Onfi 10 mg. I called [facility] pharmacy spoke with Dereck and gave verbal to fill for 5 days worth # 18 no refills. She verbally understood.    Rafael

## 2021-12-23 ENCOUNTER — APPOINTMENT (OUTPATIENT)
Dept: NEUROLOGY | Facility: MEDICAL CENTER | Age: 26
End: 2021-12-23
Payer: MEDICARE

## 2021-12-29 ENCOUNTER — TELEPHONE (OUTPATIENT)
Dept: NEUROLOGY | Facility: MEDICAL CENTER | Age: 26
End: 2021-12-29

## 2021-12-30 ENCOUNTER — TELEMEDICINE (OUTPATIENT)
Dept: NEUROLOGY | Facility: MEDICAL CENTER | Age: 26
End: 2021-12-30
Attending: REGISTERED NURSE
Payer: MEDICARE

## 2021-12-30 ENCOUNTER — TELEPHONE (OUTPATIENT)
Dept: NEUROLOGY | Facility: MEDICAL CENTER | Age: 26
End: 2021-12-30

## 2021-12-30 DIAGNOSIS — E55.9 VITAMIN D DEFICIENCY: ICD-10-CM

## 2021-12-30 DIAGNOSIS — G40.219 PHARMACORESISTANT PARTIAL EPILEPSY WITH IMPAIRMENT OF CONSCIOUSNESS, INTRACTABLE (HCC): ICD-10-CM

## 2021-12-30 PROCEDURE — 99215 OFFICE O/P EST HI 40 MIN: CPT | Mod: 95 | Performed by: REGISTERED NURSE

## 2021-12-30 RX ORDER — LORAZEPAM 0.5 MG/1
0.5 TABLET ORAL
Qty: 20 TABLET | Refills: 3 | Status: SHIPPED | OUTPATIENT
Start: 2021-12-30 | End: 2022-06-28

## 2021-12-30 RX ORDER — ZONISAMIDE 100 MG/1
300 CAPSULE ORAL
Qty: 270 CAPSULE | Refills: 3 | Status: SHIPPED | OUTPATIENT
Start: 2021-12-30 | End: 2022-12-30

## 2021-12-30 RX ORDER — CLOBAZAM 10 MG/1
10 TABLET ORAL
COMMUNITY
End: 2022-07-19

## 2021-12-30 RX ORDER — NORETHINDRONE ACETATE AND ETHINYL ESTRADIOL 1MG-20(21)
1 KIT ORAL
COMMUNITY
Start: 2021-10-11

## 2021-12-30 RX ORDER — CLOBAZAM 10 MG/1
TABLET ORAL
Qty: 105 TABLET | Refills: 5 | Status: SHIPPED | OUTPATIENT
Start: 2021-12-30 | End: 2022-06-28

## 2021-12-30 NOTE — PROGRESS NOTES
Virtual Visit: Established Patient   This visit was conducted via Zoom using secure and encrypted videoconferencing technology.   The patient was in a private location in the state of [place].    The patient's identity was confirmed and verbal consent was obtained for this virtual visit.    Subjective:   CC: Seizures     Krupaann Georgina Paredes is a 26 y.o. female presenting for evaluation and management of seizures.  She is on virtual visit with her mother and reports she is having  ~2 seizures a month.  She describes her seizures as becoming really thirsty, then her right arm feels heavier, and she can't complete sentances, then stares for a minute or two.    She is currently taking  ONFI 100mg tablets 1.5 tablets in am and 2 at bedtime, and zonisamide 100mg two capsules at NOC.   Last visit zonisamide was increased by Dr. Herbert to three tablets at night but they somehow were remiss and did not increase the dosing which is probably why seizure have been occurring 2x a month.      She is taking Vit D daily but did not have current labs drawn.         ROS:   Constitutional: No fevers or chills.  Eyes: No blurry vision or eye pain.  ENT: No dysphagia or hearing loss.  Respiratory: No cough or shortness of breath.  Cardiovascular: No chest pain or palpitations.  GI: No nausea, vomiting, or diarrhea.  : No urinary incontinence or dysuria.  Musculoskeletal: No joint swelling or arthralgias.  Skin: No skin rashes.  Neuro: No headaches, dizziness, or tremors.  Endocrine: No heat or cold intolerance. No polydipsia or polyuria.  Psych: No depression or anxiety.  Heme/Lymph: No easy bruising or swollen lymph nodes.  All other review of systems were reviewed and were negative        Current medicines (including changes today)  Current Outpatient Medications   Medication Sig Dispense Refill   • Ergocalciferol (VITAMIN D2 PO) Take  by mouth every day.     • zonisamide (ZONEGRAN) 100 MG Cap Take 3 Capsules by mouth at  bedtime. 270 capsule 3   • clobazam (ONFI) 10 MG Tab tablet Take 1.5 Tablets by mouth every day AND 2 Tablets at bedtime. Do all this for 180 days. 1.5 in morning and 2 at bedtime 105 tablet 5   • LORazepam (ATIVAN) 0.5 MG Tab Take 1 tablet by mouth 1 time a day as needed (seizures) for up to 180 days. 20 tablet 3   • Multiple Vitamin (DAILY-KIM) Tab TAKE 1 TABLET BY MOUTH DAILY 90 tablet 3   • levothyroxine (SYNTHROID) 50 MCG Tab TAKE 1 TABLET BY MOUTH EVERY DAY IN THE MORNING ON AN EMPTY STOMACH 30 Tab 2   • BLISOVI FE 1/20 1-20 MG-MCG per tablet TAKE 1 TABLET BY MOUTH EVERY DAY 28 Tab 6   • hydrocortisone sodium succinate PF (SOLU-CORTEF) 100 MG Recon Soln injection 2 mL by Intravenous route Once PRN. 2 mL 1   • therapeutic multivitamin-minerals (THERAGRAN-M) Tab Take 1 Tab by mouth every day. 30 Tab 11     No current facility-administered medications for this visit.       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      Physical Exam:  Constitutional: Alert, no distress, well-groomed.  Skin: No rashes in visible areas.  Eye: Round. Conjunctiva clear, lids normal. No icterus.   ENMT: Lips pink without lesions, good dentition, moist mucous membranes. Phonation normal.  Neck: No masses, no thyromegaly. Moves freely without pain.  Respiratory: Unlabored respiratory effort, no cough or audible wheeze  Psych: Alert and oriented x3, normal affect and mood.     Assessment and  Plan:   Miles Paredes is a 26 year old female with a history of seizures.  She was with her mother on virtual visit and they report ~2 seizures a month.  However, they did not increase her current dosing of zonisamide 100mg tablets to three at night.        Clarified that patient should be taking three tablets of zonisamide and that's possible why she has had continued seizures.  Will start the proper dosing and notify us if seizures.  Will stay with current dosing of ONFI 10mg tablets (1.5 tablets in am and 2 tablets at bedtime.) and vit D supplementation.    Blood work ordered and will be mailed. Verified with Jerel Mary that they were sent.    1. 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  Dispense: 270 Capsule; Refill: 3  - CBC WITH DIFFERENTIAL; Future  - Comp Metabolic Panel; Future  - VITAMIN D,25 HYDROXY; Future  - clobazam (ONFI) 10 MG Tab tablet; Take 1.5 Tablets by mouth every day AND 2 Tablets at bedtime. Do all this for 180 days. 1.5 in morning and 2 at bedtime  Dispense: 105 Tablet; Refill: 5  - LORazepam (ATIVAN) 0.5 MG Tab; Take 1 Tablet by mouth 1 time a day as needed (seizures) for up to 180 days.  Dispense: 20 Tablet; Refill: 3    2. Vitamin D deficiency    - CBC WITH DIFFERENTIAL; Future  - Comp Metabolic Panel; Future  - VITAMIN D,25 HYDROXY; Future      My total time spent caring for the patient on the day of the encounter was 40 minutes.   This does not include time spent on separately billable procedures/tests.    EDUCATION AND COUNSELING:  -Education was provided to the patient and/or family regarding diagnosis and prognosis. The chronic and unpredictable nature of the condition were discussed. There is increased risk for additional events, which may carry potential for significant injuries and death. Discussed frequent seizure triggers: sleep deprivation,  medication non-compliance, use of illegal drugs/alcohol, stress, and others.   -We reviewed in detail the current antiepileptic regimen. Potential side effects of antiepileptics were discussed at length, including but no limited to: hypersensitivity reactions (rash and others, some of which can be fatal), visual field changes (some of which may be irreversible), glaucoma, diplopia, kidney stones, osteopenia/osteoporosis/bone fractures, hyperthermia/anhydrosis, hyponatremia, tremors/abnormal movements, ataxia, dizziness, fatigue, increased risk for falls, risk for cardiac arrhythmias/syncope, gastrointestinal side effects(hepatitis, pancreatitis, gastritis, ulcers), gingival hypertrophy/bleeding, drowsiness, sedation, anxiety/nervousness, increased risk for suicide, increased risk for depression, and psychosis.   -We also reviewed drug-drug interactions and their potential effect on seizure control and medication side effects.    -Recommend chronic vitamin D supplementation and regular exercise (if not contraindicated).   -Avoid sleep deprivation.   -We extensively discussed the aspects related to safety in drivers who suffer from epilepsy. The patient is encourage to report to the Division of Motor Vehicles of any condition and/or spells related to confusion, disorientation, and/or loss of awareness and/or loss of consciousness; as these may pose a safety issue if they occur while operating a motor vehicle. The patient and/or family are ultimately responsible for exercising caution and abiding to regulations in place.  She does not drive.  -Other seizure precautions were discussed.   -The patient understands and agrees that due to the complexity of his/her diagnosis, results of any testing and further recommendations will typically be discussed/made during a face to face encounter in my office. The patient and/or family further understands it is their responsibility to keep proper follow up.      Patient agrees with  plan, as outlined.            Follow-up: No follow-ups on file.

## 2022-01-07 ENCOUNTER — HOSPITAL ENCOUNTER (OUTPATIENT)
Dept: LAB | Facility: MEDICAL CENTER | Age: 27
End: 2022-01-07
Attending: REGISTERED NURSE
Payer: MEDICARE

## 2022-01-07 DIAGNOSIS — G40.219 PHARMACORESISTANT PARTIAL EPILEPSY WITH IMPAIRMENT OF CONSCIOUSNESS, INTRACTABLE (HCC): ICD-10-CM

## 2022-01-07 DIAGNOSIS — E55.9 VITAMIN D DEFICIENCY: ICD-10-CM

## 2022-01-07 LAB
25(OH)D3 SERPL-MCNC: 34 NG/ML (ref 30–100)
ALBUMIN SERPL BCP-MCNC: 4.3 G/DL (ref 3.2–4.9)
ALBUMIN/GLOB SERPL: 1.5 G/DL
ALP SERPL-CCNC: 101 U/L (ref 30–99)
ALT SERPL-CCNC: 13 U/L (ref 2–50)
ANION GAP SERPL CALC-SCNC: 13 MMOL/L (ref 7–16)
AST SERPL-CCNC: 16 U/L (ref 12–45)
BASOPHILS # BLD AUTO: 0.3 % (ref 0–1.8)
BASOPHILS # BLD: 0.02 K/UL (ref 0–0.12)
BILIRUB SERPL-MCNC: 0.4 MG/DL (ref 0.1–1.5)
BUN SERPL-MCNC: 9 MG/DL (ref 8–22)
CALCIUM SERPL-MCNC: 8.7 MG/DL (ref 8.5–10.5)
CHLORIDE SERPL-SCNC: 102 MMOL/L (ref 96–112)
CO2 SERPL-SCNC: 19 MMOL/L (ref 20–33)
CREAT SERPL-MCNC: 0.94 MG/DL (ref 0.5–1.4)
EOSINOPHIL # BLD AUTO: 0.03 K/UL (ref 0–0.51)
EOSINOPHIL NFR BLD: 0.5 % (ref 0–6.9)
ERYTHROCYTE [DISTWIDTH] IN BLOOD BY AUTOMATED COUNT: 42 FL (ref 35.9–50)
FASTING STATUS PATIENT QL REPORTED: NORMAL
GLOBULIN SER CALC-MCNC: 2.9 G/DL (ref 1.9–3.5)
GLUCOSE SERPL-MCNC: 85 MG/DL (ref 65–99)
HCT VFR BLD AUTO: 40.9 % (ref 37–47)
HGB BLD-MCNC: 13.9 G/DL (ref 12–16)
IMM GRANULOCYTES # BLD AUTO: 0.02 K/UL (ref 0–0.11)
IMM GRANULOCYTES NFR BLD AUTO: 0.3 % (ref 0–0.9)
LYMPHOCYTES # BLD AUTO: 2.06 K/UL (ref 1–4.8)
LYMPHOCYTES NFR BLD: 31.8 % (ref 22–41)
MCH RBC QN AUTO: 32 PG (ref 27–33)
MCHC RBC AUTO-ENTMCNC: 34 G/DL (ref 33.6–35)
MCV RBC AUTO: 94.2 FL (ref 81.4–97.8)
MONOCYTES # BLD AUTO: 0.36 K/UL (ref 0–0.85)
MONOCYTES NFR BLD AUTO: 5.6 % (ref 0–13.4)
NEUTROPHILS # BLD AUTO: 3.98 K/UL (ref 2–7.15)
NEUTROPHILS NFR BLD: 61.5 % (ref 44–72)
NRBC # BLD AUTO: 0 K/UL
NRBC BLD-RTO: 0 /100 WBC
PLATELET # BLD AUTO: 192 K/UL (ref 164–446)
PMV BLD AUTO: 8.3 FL (ref 9–12.9)
POTASSIUM SERPL-SCNC: 3.6 MMOL/L (ref 3.6–5.5)
PROT SERPL-MCNC: 7.2 G/DL (ref 6–8.2)
RBC # BLD AUTO: 4.34 M/UL (ref 4.2–5.4)
SODIUM SERPL-SCNC: 134 MMOL/L (ref 135–145)
WBC # BLD AUTO: 6.5 K/UL (ref 4.8–10.8)

## 2022-01-07 PROCEDURE — 80053 COMPREHEN METABOLIC PANEL: CPT

## 2022-01-07 PROCEDURE — 85025 COMPLETE CBC W/AUTO DIFF WBC: CPT

## 2022-01-07 PROCEDURE — 36415 COLL VENOUS BLD VENIPUNCTURE: CPT

## 2022-01-07 PROCEDURE — 82306 VITAMIN D 25 HYDROXY: CPT

## 2022-01-09 RX ORDER — MULTIVITAMIN WITH FOLIC ACID 400 MCG
TABLET ORAL
Qty: 90 TABLET | Refills: 3 | Status: SHIPPED | OUTPATIENT
Start: 2022-01-09

## 2022-03-03 DIAGNOSIS — G40.219 PHARMACORESISTANT PARTIAL EPILEPSY WITH IMPAIRMENT OF CONSCIOUSNESS, INTRACTABLE (HCC): ICD-10-CM

## 2022-03-03 RX ORDER — CLOBAZAM 10 MG/1
TABLET ORAL
Qty: 105 TABLET | Refills: 3 | Status: SHIPPED | OUTPATIENT
Start: 2022-03-03 | End: 2022-07-11 | Stop reason: SDUPTHER

## 2022-03-09 ENCOUNTER — TELEPHONE (OUTPATIENT)
Dept: NEUROLOGY | Facility: MEDICAL CENTER | Age: 27
End: 2022-03-09
Payer: MEDICARE

## 2022-03-09 NOTE — TELEPHONE ENCOUNTER
Called [facility] pharmacy back to clarify onfi day supply. Pharmacist said it had been clarified and dispensed to patient already.

## 2022-07-11 DIAGNOSIS — G40.219 PHARMACORESISTANT PARTIAL EPILEPSY WITH IMPAIRMENT OF CONSCIOUSNESS, INTRACTABLE (HCC): ICD-10-CM

## 2022-07-11 RX ORDER — CLOBAZAM 10 MG/1
TABLET ORAL
Qty: 105 TABLET | Refills: 2 | Status: SHIPPED | OUTPATIENT
Start: 2022-07-11 | End: 2022-07-19 | Stop reason: SDUPTHER

## 2022-07-19 ENCOUNTER — TELEPHONE (OUTPATIENT)
Dept: NEUROLOGY | Facility: MEDICAL CENTER | Age: 27
End: 2022-07-19

## 2022-07-19 DIAGNOSIS — G40.219 PHARMACORESISTANT PARTIAL EPILEPSY WITH IMPAIRMENT OF CONSCIOUSNESS, INTRACTABLE (HCC): ICD-10-CM

## 2022-07-19 RX ORDER — CLOBAZAM 10 MG/1
TABLET ORAL
Qty: 105 TABLET | Refills: 2 | Status: SHIPPED | OUTPATIENT
Start: 2022-07-19 | End: 2022-10-17

## 2022-11-09 ENCOUNTER — PATIENT MESSAGE (OUTPATIENT)
Dept: HEALTH INFORMATION MANAGEMENT | Facility: OTHER | Age: 27
End: 2022-11-09

## 2025-05-26 NOTE — PROGRESS NOTES
Pt to [facility] for Neurosurgery visit, accompanied by mother.  Pt awake and alert, afebrile, VSS.  Visit completed with Dr. Valero.  Will schedule follow-up in 12 months with MRI.  Referral to [facility] to be made.  Pt home with mother.    Level of Care/Points                 Assessment   Pts      Focused nursing assessment    Full nursing assessment   5 Vital signs - calculate every time perfomed          Special Needs    Pediatric/Minor Patient Management    Hear/Language/Visual special needs    Additional assistance/Altered mentation/physical limitations    Play Therapy/Diversion Activity    Isolation Management         Focused Assessment    Pain assessment    Neuro assessment    Potential abuse assessment           Coordination of Care   5 Simple Patient/Family/Staff Education for ongoing care    Complex Patient/Family/Staff Education for ongoing care   5 Staff retrieves consents, Records, test results, processes orders    Staff Telephones Physician office to clarify orders    Coordination of consults   10 Simple Discharge Coordination    Complex (extensive) Discharge Coordination         Interventions    PO meds 1-3 calculate additional 5 points for 4-6 meds and apply as many times as needed    Sublingual Meds (1-3)    Sublingual Meds (4-6)    Suppositories calculate for each time given    Topical Meds (1-3), these medications include topical lidocaine, ointment, ect    Topical Meds (4-6), these medications include topical lidocaine, ointment, ect       Eye Drops - eye drops should be calculated per time given.  Multiple drops per eye should not be counted seperately    Medication Titration calculation once    Oxygen Cannula only if placed by staff    Oxygen Mask only if placed by staff         Central Venous Access Device    Sterile dressing change    PICC arm circumference and external catheter    Central Venous Catheter Removal         Miscellaneous    Difficult Specimen collection  0-3 years old (cultures, biopsies, blood, bodily fluids, etc    Patient Transfer (multiple staff/Lift equipment    Replace Tracheostomy Tube    Tracheostomy care and dressing change    Tracheostomy suctioning    Medication Reaction    Blood Product Reaction       Point Assessment     New/Established Patient - Level 1 (15-20 points)    x New/Established Patient - Level 2 (21-45 points)     New/Established Patient - Level 3 (46-70 points)     New/Established Patient - Level 4 ( points)     New/Established Patient - Level 5 (106 or more)                  Clear bilaterally, pupils equal, round and reactive to light. EOMI